# Patient Record
Sex: FEMALE | Race: WHITE | Employment: OTHER | URBAN - METROPOLITAN AREA
[De-identification: names, ages, dates, MRNs, and addresses within clinical notes are randomized per-mention and may not be internally consistent; named-entity substitution may affect disease eponyms.]

---

## 2020-12-14 ENCOUNTER — APPOINTMENT (OUTPATIENT)
Dept: CV DIAGNOSTICS | Facility: HOSPITAL | Age: 70
End: 2020-12-14
Attending: INTERNAL MEDICINE
Payer: MEDICARE

## 2020-12-14 ENCOUNTER — APPOINTMENT (OUTPATIENT)
Dept: CT IMAGING | Facility: HOSPITAL | Age: 70
End: 2020-12-14
Attending: HOSPITALIST
Payer: MEDICARE

## 2020-12-14 ENCOUNTER — HOSPITAL ENCOUNTER (OUTPATIENT)
Facility: HOSPITAL | Age: 70
Setting detail: OBSERVATION
LOS: 1 days | Discharge: HOME | End: 2020-12-15
Attending: EMERGENCY MEDICINE | Admitting: HOSPITALIST
Payer: MEDICARE

## 2020-12-14 DIAGNOSIS — I16.0 HYPERTENSIVE URGENCY: Primary | ICD-10-CM

## 2020-12-14 DIAGNOSIS — H57.04 FIXED DILATED PUPILS OF BOTH EYES: ICD-10-CM

## 2020-12-14 LAB
HCT VFR BLD CALC: 42.1 %
HGB BLD-MCNC: 13.8 G/DL
MCH RBC QN AUTO: 30.7 PG
MCHC RBC AUTO-ENTMCNC: 32.8 G/DL
MCV RBC AUTO: 93.6 FL
PLATELET # BLD: 355 K/MCL
RBC # BLD: 4.5 10*6/UL
TSH SERPL-ACNC: 2.43 MCUNITS/ML
WBC # BLD: 9.9 K/MCL

## 2020-12-14 PROCEDURE — 99220 INITIAL OBSERVATION CARE,LEVL III: CPT | Performed by: HOSPITALIST

## 2020-12-14 PROCEDURE — 93306 TTE W/DOPPLER COMPLETE: CPT | Performed by: INTERNAL MEDICINE

## 2020-12-14 PROCEDURE — 70450 CT HEAD/BRAIN W/O DYE: CPT | Performed by: HOSPITALIST

## 2020-12-14 RX ORDER — METOCLOPRAMIDE HYDROCHLORIDE 5 MG/ML
10 INJECTION INTRAMUSCULAR; INTRAVENOUS EVERY 8 HOURS PRN
Status: DISCONTINUED | OUTPATIENT
Start: 2020-12-14 | End: 2020-12-15

## 2020-12-14 RX ORDER — ONDANSETRON 2 MG/ML
4 INJECTION INTRAMUSCULAR; INTRAVENOUS EVERY 6 HOURS PRN
Status: DISCONTINUED | OUTPATIENT
Start: 2020-12-14 | End: 2020-12-15

## 2020-12-14 RX ORDER — METOPROLOL SUCCINATE 50 MG/1
50 TABLET, EXTENDED RELEASE ORAL
Status: DISCONTINUED | OUTPATIENT
Start: 2020-12-14 | End: 2020-12-15

## 2020-12-14 RX ORDER — METOPROLOL SUCCINATE 25 MG/1
25 TABLET, EXTENDED RELEASE ORAL
Status: DISCONTINUED | OUTPATIENT
Start: 2020-12-14 | End: 2020-12-14

## 2020-12-14 RX ORDER — HYDRALAZINE HYDROCHLORIDE 20 MG/ML
20 INJECTION INTRAMUSCULAR; INTRAVENOUS ONCE
Status: COMPLETED | OUTPATIENT
Start: 2020-12-14 | End: 2020-12-14

## 2020-12-14 RX ORDER — HYDROCODONE BITARTRATE AND ACETAMINOPHEN 5; 325 MG/1; MG/1
1 TABLET ORAL EVERY 4 HOURS PRN
Status: DISCONTINUED | OUTPATIENT
Start: 2020-12-14 | End: 2020-12-15

## 2020-12-14 RX ORDER — HYDROCODONE BITARTRATE AND ACETAMINOPHEN 5; 325 MG/1; MG/1
2 TABLET ORAL EVERY 4 HOURS PRN
Status: DISCONTINUED | OUTPATIENT
Start: 2020-12-14 | End: 2020-12-15

## 2020-12-14 RX ORDER — METOPROLOL TARTRATE 5 MG/5ML
10 INJECTION INTRAVENOUS ONCE
Status: COMPLETED | OUTPATIENT
Start: 2020-12-14 | End: 2020-12-14

## 2020-12-14 RX ORDER — ACETAMINOPHEN 325 MG/1
650 TABLET ORAL EVERY 4 HOURS PRN
Status: DISCONTINUED | OUTPATIENT
Start: 2020-12-14 | End: 2020-12-15

## 2020-12-14 RX ORDER — HYDRALAZINE HYDROCHLORIDE 20 MG/ML
10 INJECTION INTRAMUSCULAR; INTRAVENOUS ONCE
Status: COMPLETED | OUTPATIENT
Start: 2020-12-14 | End: 2020-12-14

## 2020-12-14 NOTE — HOME CARE LIAISON
Received referral from [address] [place] [address]. Spoke with patient, confirmed agreeable to [facility], pending orders. All questions addressed and answered. Patient will need a face to face entered prior to discharge.

## 2020-12-14 NOTE — CM/SW NOTE
Per RN rounds, pt may benefit from [place] services at time of d/c. Referral initiated w/ Irene from [facility]. Received update from Irene w/ [facility] [place] - they are able to accept and pt is agreeable to services at time of d/c. Ireen updated pt's RN and Dr. Gerardo Seip.

## 2020-12-14 NOTE — CONSULTS
[facility]    PATIENT'S NAME: Stephanie Bender   ATTENDING PHYSICIAN: Fidel Rhodes MD   CONSULTING PHYSICIAN: Louis Muller.  Colleen Persaud MD   PATIENT ACCOUNT#:   [id number]    LOCATION:  [address] #:   [id number]       DATE OF BIRTH:  [birth year] 204/100, most recent blood pressure is 143/73; pulse 104, regular rhythm; respirations 18, unlabored; afebrile; saturation 98% on room air. HEENT:  Unremarkable. NECK:  Supple. Jugular venous pressure normal.  Carotids brisk without bruits.   Earnstine Beverage

## 2020-12-14 NOTE — ED INITIAL ASSESSMENT (HPI)
S: HBP  B: Patient states that she felt unwell around late afternoon. Patient states that she checked her bp at home and got systolic over 318. No hx of hbp.  Patient states she feels \"shaky\"

## 2020-12-14 NOTE — CONSULTS
Cardiology (consult dictated). Assessment:  1. Hypertensive urgency. Blood pressure improved after administration of IV metoprolol and hydralazine. 2.  No other major medical history. Plan:  1. Begin metoprolol succinate 50 mg daily.     2.  Mee Martines

## 2020-12-14 NOTE — ED PROVIDER NOTES
Patient Seen in: [facility] AND [facility] Emergency Department      History   Patient presents with:  Hypertension    Stated Complaint: high bp and palpitations     HPI    50-year-old female with no significant past medical history here with complaints of eleva noted in HPI. Constitutional and vital signs reviewed. All other systems reviewed and negative except as noted above.     Physical Exam     ED Triage Vitals   BP 12/14/20 0329 (!) 204/100   Pulse 12/14/20 0327 103   Resp 12/14/20 0327 20   Temp 12/14/ mmol/L    Potassium 3.8 3.5 - 5.1 mmol/L    Chloride 103 98 - 112 mmol/L    CO2 28.0 21.0 - 32.0 mmol/L    Anion Gap 7 0 - 18 mmol/L    BUN 18 7 - 18 mg/dL    Creatinine 0.92 0.55 - 1.02 mg/dL    BUN/CREA Ratio 19.6 10.0 - 20.0    Calcium, Total 9.0 8.5 - COURSE AND TREATMENT:  Patient's condition was fair during Emergency Department evaluation.      73yoF with hypertension  - I personally reviewed and interpreted all the ED vitals  - afebrile, hypertensive  - I ordered and personally reviewed the labs and i recommend that you schedule follow up care with a primary care provider within the next three months to obtain basic health screening including reassessment of your blood pressure.       Medications Prescribed:  There are no discharge medications for this p

## 2020-12-14 NOTE — H&P
Donta Christian 67 Patient Status:  Inpatient    1950 MRN [id number]   Location [facility] 3W/SW Attending Anabella Montana MD   Hosp Day # 0 PCP None Pcp     Date:  2020  Date of Admissio Exam:   Vital Signs:  Blood pressure (!) 161/64, pulse 112, temperature 98.5 °F (36.9 °C), temperature source Oral, resp. rate 18, height 5' 1\" (1.549 m), SpO2 98 %.      GENERAL:  Positive for dilated pupils  SKIN:  Warm and hydrated  PSYCHIATRIC: Calm an 161/64  Patient has dilated pupils on physical exam- will order CT of head to rule out stroke  Tylenol for headache   Consult Cardiology  May need stress test and echo- will await Cards recs  Will also need to start PO BP meds- will await recs   Monitor on

## 2020-12-14 NOTE — ED NOTES
Patient presents to ER after having high blood pressure reading at home patient states that late afternoon on Sunday felt \"nausous and shakey\" took blood pressure at home and noted 200/100 with feeling of shortness of breath patient also report heart rat

## 2020-12-14 NOTE — ED NOTES
Orders for admission, patient is aware of plan and ready to go upstairs. Any questions, please call ED RN Lindsay  at extension [phone].    Patient presents to ER after feeling shaky and nauseous starting yesterday afternoon took blood pressure at home and no

## 2020-12-15 VITALS
BODY MASS INDEX: 34.15 KG/M2 | DIASTOLIC BLOOD PRESSURE: 67 MMHG | TEMPERATURE: 99 F | HEIGHT: 61 IN | HEART RATE: 67 BPM | SYSTOLIC BLOOD PRESSURE: 153 MMHG | OXYGEN SATURATION: 96 % | WEIGHT: 180.88 LBS | RESPIRATION RATE: 16 BRPM

## 2020-12-15 LAB
ANION GAP SERPL CALC-SCNC: 9 MMOL/L
BUN SERPL-MCNC: 13 MG/DL
BUN/CREAT SERPL: 12.5
CALCIUM SERPL-MCNC: 9.5 MG/DL
CHLORIDE SERPL-SCNC: 102 MMOL/L
CO2 SERPL-SCNC: 28 MMOL/L
CREAT SERPL-MCNC: 1.04 MG/DL
GLUCOSE SERPL-MCNC: 124 MG/DL
POTASSIUM SERPL-SCNC: 4 MMOL/L
SODIUM SERPL-SCNC: 139 MMOL/L

## 2020-12-15 PROCEDURE — 99217 OBSERVATION CARE DISCHARGE: CPT | Performed by: HOSPITALIST

## 2020-12-15 RX ORDER — METOPROLOL SUCCINATE 50 MG/1
50 TABLET, EXTENDED RELEASE ORAL
Qty: 30 TABLET | Refills: 2 | Status: SHIPPED | OUTPATIENT
Start: 2020-12-16 | End: 2021-03-18 | Stop reason: DRUGHIGH

## 2020-12-15 NOTE — PLAN OF CARE
Problem: Patient Centered Care  Goal: Patient preferences are identified and integrated in the patient's plan of care  Description: Interventions:  - What would you like us to know as we care for you?  I live at home alone  - Provide timely, complete, and Patient ambulating. Call light and personal belongings within reach. Non-skid socks on. Frequent rounding continued.

## 2020-12-15 NOTE — CM/SW NOTE
Patient failed inpatient criteria. Second level of review completed and supports observation. UR committee in agreement. Discussed with Dr. Kirt Lombardi  who approves observation status. MOON given to the patient and order written.

## 2020-12-15 NOTE — DISCHARGE SUMMARY
[facility]    Discharge Summary    [place] Patient Status:  Inpatient    1950 MRN [id number]   Location [facility] 3W/SW Attending Herminia Galdamez MD   [facility] Day # 1 PCP None Pcp     Date of Admission: 2020   Date see an integrative medicine doctor- Recommended Dr. Ramírez Beltran  - will need stress test to rule out CAD- order placed  - will also need MRI of the brain to rule out cause of dilated pupils (tumor?  Herniation?)- patient asymptomatic     DVT proph- heparin    Fu reached by phone at [phone]. The fax number for your reference is [phone]. A representative from the home health agency will contact you or your family to schedule your first visit.             Time spent:  > 30 minutes    [address]  12/

## 2020-12-15 NOTE — PROGRESS NOTES
[facility]IN[facility]  Progress Note    Louie Velasquez Patient Status:  Inpatient    1950 MRN [id number]   Location [facility] 3W/SW Attending Aby Garcias MD   Hosp Day # 1 PCP None Pcp     Assessment:    1. Hypertension.   Better control th PT, INR        Lab Results   Component Value Date    TROP <0.045 12/14/2020    TROP <0.045 12/14/2020        Medications:    • Metoprolol Succinate ER  50 mg Oral Daily Beta Blocker         Chey Doss MD  12/15/2020  8:38 AM

## 2020-12-16 ENCOUNTER — TELEPHONE (OUTPATIENT)
Dept: CARDIOLOGY | Age: 70
End: 2020-12-16

## 2020-12-16 ENCOUNTER — TELEPHONE (OUTPATIENT)
Dept: INTERNAL MEDICINE UNIT | Facility: HOSPITAL | Age: 70
End: 2020-12-16

## 2020-12-16 ENCOUNTER — TELEPHONE (OUTPATIENT)
Dept: CARDIOLOGY CLINIC | Facility: CLINIC | Age: 70
End: 2020-12-16

## 2020-12-16 NOTE — TELEPHONE ENCOUNTER
Need new order for Cardiac Stress test, with Qualifying Medicare [employer] - Pt. has sched appt. for 12/21/2020. RN states that Dr Nasreen Shearer saw this pt. At [facility] for Consult on 12/14/2020 and again on 12/15/2020. Please enter order in Epic.

## 2020-12-16 NOTE — TELEPHONE ENCOUNTER
Call received from Jenny Stevens [phone]) stating Hypertensive urgency is not a qualifying diagnosis for Outpatient Stress test and a new order was needed, pt is scheduled on 12/21/2020.  Call made to Dr Sherlyn Christianson, cardiology office for further

## 2020-12-16 NOTE — TELEPHONE ENCOUNTER
Call received by Hospitalist DEMI Ring from Cardiology triage stating Dr Naz Barrientos would like to see pt in the office prior to having pt do a stress test. RN to cancel stress test ordered and cardiology will assess pt in the office then reenter order as ne

## 2020-12-16 NOTE — TELEPHONE ENCOUNTER
Spoke with Peter Patient, Dr. Kaylen Hernandes RN at [address] [place] [address]. Discharge summary instructs patient to follow up at [facility] with Dr. Jama Woodruff in 3 weeks. She will have their office contact patient for appointment with [facility] at [facility].   Per Dr. Jama Woodruff consult note 12/14,   \"Begin metopr

## 2020-12-17 ENCOUNTER — TELEPHONE (OUTPATIENT)
Dept: INTEGRATIVE MEDICINE | Facility: CLINIC | Age: 70
End: 2020-12-17

## 2020-12-17 ENCOUNTER — OFFICE VISIT (OUTPATIENT)
Dept: INTEGRATIVE MEDICINE | Facility: CLINIC | Age: 70
End: 2020-12-17
Payer: MEDICARE

## 2020-12-17 VITALS
OXYGEN SATURATION: 97 % | HEART RATE: 75 BPM | DIASTOLIC BLOOD PRESSURE: 88 MMHG | HEIGHT: 60.5 IN | WEIGHT: 181.63 LBS | SYSTOLIC BLOOD PRESSURE: 152 MMHG | BODY MASS INDEX: 34.74 KG/M2

## 2020-12-17 DIAGNOSIS — M25.552 LEFT HIP PAIN: ICD-10-CM

## 2020-12-17 DIAGNOSIS — S46.011A STRAIN OF RIGHT ROTATOR CUFF CAPSULE, INITIAL ENCOUNTER: ICD-10-CM

## 2020-12-17 DIAGNOSIS — I10 HYPERTENSION, ESSENTIAL: Primary | ICD-10-CM

## 2020-12-17 PROCEDURE — 99496 TRANSJ CARE MGMT HIGH F2F 7D: CPT | Performed by: FAMILY MEDICINE

## 2020-12-17 PROCEDURE — 1111F DSCHRG MED/CURRENT MED MERGE: CPT | Performed by: FAMILY MEDICINE

## 2020-12-17 NOTE — TELEPHONE ENCOUNTER
Vero Ocasio [facility]  QB#[phone] requesting if provider will sign off home health RN orders - Patient is to be seen this evening.  She is requesting a call back, does not need to speak directly with provider may speak with medical staff

## 2020-12-17 NOTE — TELEPHONE ENCOUNTER
Routed to Dr. Stephen Pulido for review and recommendation. Will await response after patient's initial visit tonight at 6:30 pm. Unless MD is agreeable to sign off on orders prior to appt.

## 2020-12-18 ENCOUNTER — TELEPHONE (OUTPATIENT)
Dept: CARDIOLOGY | Age: 70
End: 2020-12-18

## 2020-12-18 ENCOUNTER — TELEPHONE (OUTPATIENT)
Dept: INTEGRATIVE MEDICINE | Facility: CLINIC | Age: 70
End: 2020-12-18

## 2020-12-18 NOTE — TELEPHONE ENCOUNTER
Spoke with Lucinda from [facility] VV#[phone]. I asked for a faxed order regarding refusal of care. Lucinda said home health was not initiated yet - that is what pt is refusing. No orders to sign off on. Will notify MD so she is aware.

## 2020-12-18 NOTE — PATIENT INSTRUCTIONS
I have complete vero in the body's ability to heal and transform. The products and items listed below (the “Products”)  and their claims have not been evaluated by the Food and Drug Administration.  Dietary products are not intended to treat, prevent, m before taking a measurement. Also, go to the toilet first. A full bladder can increase blood pressure slightly. - Sit quietly before and during monitoring.  When you're ready to take your blood pressure, sit for five minutes in a comfortable position with

## 2020-12-18 NOTE — TELEPHONE ENCOUNTER
Left message notifying Ana Berry that Dr. Andrew Blanchard is agreeable to sign off on home health orders. Provided office phone and fax numbers.

## 2020-12-18 NOTE — PROGRESS NOTES
Jamie France is a 72year old female.   Patient presents with:  [facility] F/U: hypertensive urgency   Back Pain: stiff lower back   Hip Pain: L hip   Shoulder Pain: R shoulder pain       HPI:     3 days ago she Started feeling nauseous, HA, Food insecurity        Worry: Not on file        Inability: Not on file      Transportation needs        Medical: Not on file        Non-medical: Not on file    Tobacco Use      Smoking status: Never Smoker      Smokeless tobacco: Never Used    Substan Pulmonary/Chest: Effort normal and breath sounds normal.   Musculoskeletal:         General: No edema. Lymphadenopathy:     She has no cervical adenopathy. Neurological: She is alert and oriented to person, place, and time. No cranial nerve deficit.  Hany Mendez The products and items listed below (the “Products”)  and their claims have not been evaluated by the Food and Drug Administration. Dietary products are not intended to treat, prevent, mitigate or cure disease.  Ultimately, you must draw your own conclusion - Avoid food, caffeine, tobacco and alcohol for 30 minutes before taking a measurement. Also, go to the toilet first. A full bladder can increase blood pressure slightly. - Sit quietly before and during monitoring.  When you're ready to take your blood pre

## 2020-12-24 ENCOUNTER — HOSPITAL ENCOUNTER (OUTPATIENT)
Dept: MRI IMAGING | Facility: HOSPITAL | Age: 70
Discharge: HOME | End: 2020-12-24
Attending: HOSPITALIST
Payer: MEDICARE

## 2020-12-24 DIAGNOSIS — H57.04 FIXED DILATED PUPILS OF BOTH EYES: ICD-10-CM

## 2020-12-24 DIAGNOSIS — I16.0 HYPERTENSIVE URGENCY: ICD-10-CM

## 2020-12-24 PROCEDURE — A9575 INJ GADOTERATE MEGLUMI 0.1ML: HCPCS | Performed by: HOSPITALIST

## 2020-12-24 PROCEDURE — 70553 MRI BRAIN STEM W/O & W/DYE: CPT | Performed by: HOSPITALIST

## 2021-01-05 RX ORDER — METOPROLOL SUCCINATE 50 MG/1
0.5 TABLET, EXTENDED RELEASE ORAL DAILY
COMMUNITY
Start: 2020-12-15 | End: 2021-01-08 | Stop reason: DRUGHIGH

## 2021-01-06 ENCOUNTER — TELEPHONE (OUTPATIENT)
Dept: CARDIOLOGY | Age: 71
End: 2021-01-06

## 2021-01-08 ENCOUNTER — OFFICE VISIT (OUTPATIENT)
Dept: CARDIOLOGY | Age: 71
End: 2021-01-08

## 2021-01-08 ENCOUNTER — ANCILLARY PROCEDURE (OUTPATIENT)
Dept: CARDIOLOGY | Age: 71
End: 2021-01-08
Attending: INTERNAL MEDICINE

## 2021-01-08 VITALS
DIASTOLIC BLOOD PRESSURE: 80 MMHG | SYSTOLIC BLOOD PRESSURE: 146 MMHG | OXYGEN SATURATION: 98 % | WEIGHT: 179 LBS | HEART RATE: 74 BPM | BODY MASS INDEX: 33.79 KG/M2 | HEIGHT: 61 IN | RESPIRATION RATE: 18 BRPM

## 2021-01-08 DIAGNOSIS — I10 ESSENTIAL HYPERTENSION: Primary | ICD-10-CM

## 2021-01-08 DIAGNOSIS — R00.2 PALPITATIONS: ICD-10-CM

## 2021-01-08 DIAGNOSIS — R06.83 SNORES: ICD-10-CM

## 2021-01-08 PROCEDURE — 99205 OFFICE O/P NEW HI 60 MIN: CPT | Performed by: INTERNAL MEDICINE

## 2021-01-08 RX ORDER — METOPROLOL SUCCINATE 25 MG/1
25 TABLET, EXTENDED RELEASE ORAL 2 TIMES DAILY
Qty: 180 TABLET | Refills: 3 | Status: SHIPPED | OUTPATIENT
Start: 2021-01-08

## 2021-01-08 SDOH — HEALTH STABILITY: MENTAL HEALTH: HOW OFTEN DO YOU HAVE A DRINK CONTAINING ALCOHOL?: MONTHLY OR LESS

## 2021-01-08 ASSESSMENT — PATIENT HEALTH QUESTIONNAIRE - PHQ9
SUM OF ALL RESPONSES TO PHQ9 QUESTIONS 1 AND 2: 0
2. FEELING DOWN, DEPRESSED OR HOPELESS: NOT AT ALL
1. LITTLE INTEREST OR PLEASURE IN DOING THINGS: NOT AT ALL
2. FEELING DOWN, DEPRESSED OR HOPELESS: NOT AT ALL
SUM OF ALL RESPONSES TO PHQ9 QUESTIONS 1 AND 2: 0
CLINICAL INTERPRETATION OF PHQ2 SCORE: NO FURTHER SCREENING NEEDED
1. LITTLE INTEREST OR PLEASURE IN DOING THINGS: NOT AT ALL
CLINICAL INTERPRETATION OF PHQ2 SCORE: NO FURTHER SCREENING NEEDED
CLINICAL INTERPRETATION OF PHQ9 SCORE: NO FURTHER SCREENING NEEDED
SUM OF ALL RESPONSES TO PHQ9 QUESTIONS 1 AND 2: 0

## 2021-01-11 ENCOUNTER — MED REC SCAN ONLY (OUTPATIENT)
Dept: INTEGRATIVE MEDICINE | Facility: CLINIC | Age: 71
End: 2021-01-11

## 2021-01-12 ENCOUNTER — ANCILLARY PROCEDURE (OUTPATIENT)
Dept: CARDIOLOGY | Age: 71
End: 2021-01-12
Attending: INTERNAL MEDICINE

## 2021-01-12 DIAGNOSIS — R06.83 SNORES: ICD-10-CM

## 2021-01-12 DIAGNOSIS — I10 ESSENTIAL HYPERTENSION: ICD-10-CM

## 2021-01-13 PROCEDURE — 95800 SLP STDY UNATTENDED: CPT | Performed by: INTERNAL MEDICINE

## 2021-01-13 PROCEDURE — 93227 XTRNL ECG REC<48 HR R&I: CPT | Performed by: INTERNAL MEDICINE

## 2021-01-25 ENCOUNTER — TELEPHONE (OUTPATIENT)
Dept: CARDIOLOGY | Age: 71
End: 2021-01-25

## 2021-01-25 ENCOUNTER — OFFICE VISIT (OUTPATIENT)
Dept: INTEGRATIVE MEDICINE | Facility: CLINIC | Age: 71
End: 2021-01-25
Payer: MEDICARE

## 2021-01-25 VITALS
OXYGEN SATURATION: 99 % | DIASTOLIC BLOOD PRESSURE: 90 MMHG | BODY MASS INDEX: 34.88 KG/M2 | HEIGHT: 60.5 IN | HEART RATE: 72 BPM | SYSTOLIC BLOOD PRESSURE: 220 MMHG | WEIGHT: 182.38 LBS

## 2021-01-25 DIAGNOSIS — I16.0 HYPERTENSIVE URGENCY: Primary | ICD-10-CM

## 2021-01-25 DIAGNOSIS — G47.33 OBSTRUCTIVE SLEEP APNEA SYNDROME: ICD-10-CM

## 2021-01-25 DIAGNOSIS — R00.2 PALPITATIONS: ICD-10-CM

## 2021-01-25 DIAGNOSIS — R11.0 NAUSEA: ICD-10-CM

## 2021-01-25 DIAGNOSIS — I10 HYPERTENSION, ESSENTIAL: ICD-10-CM

## 2021-01-25 PROBLEM — R06.83 SNORES: Status: RESOLVED | Noted: 2021-01-08 | Resolved: 2021-01-25

## 2021-01-25 PROBLEM — R06.83 SNORES: Status: ACTIVE | Noted: 2021-01-08

## 2021-01-25 PROCEDURE — 99214 OFFICE O/P EST MOD 30 MIN: CPT | Performed by: FAMILY MEDICINE

## 2021-01-25 RX ORDER — LOSARTAN POTASSIUM 100 MG/1
TABLET ORAL
Qty: 90 TABLET | Refills: 0 | OUTPATIENT
Start: 2021-01-25

## 2021-01-25 RX ORDER — LOSARTAN POTASSIUM 100 MG/1
100 TABLET ORAL DAILY
COMMUNITY
End: 2021-01-29 | Stop reason: DRUGHIGH

## 2021-01-25 RX ORDER — LOSARTAN POTASSIUM 100 MG/1
100 TABLET ORAL DAILY
Qty: 30 TABLET | Refills: 1 | Status: SHIPPED | OUTPATIENT
Start: 2021-01-25 | End: 2021-03-18

## 2021-01-25 NOTE — PROGRESS NOTES
Daily Note     Today's date: 2018  Patient name: Michelle Prakash  : 1949  MRN: [id number]  Referring provider: Ruiz Restrepo MD  Dx:   Encounter Diagnosis     ICD-10-CM    1  Primary osteoarthritis of right knee M17 11                   Subjective: Pt states he is feeling better since his last visit  "It's not so much as pain, but more stiffness "  Pt reported that any kind of twisting drastically increases levels of pain  Received ambulating into gym using SPC  Pt reported feeling more loose at end of session  Objective: See treatment diary below  Assessment: Tolerated treatment well  Patient would benefit from continued PT  Pt required little to no verbal cueing for proper form and technique while performing exercises  Minimal verbal cues needed for posture during FSU  Pt exhibit pain at end range during PROM of knee in both flex/ext  Pt ambulated with antalgic gait of R LE which improved throughout session  Plan: Continue per plan of care  Progress treatment as tolerated  Precautions: right TKA (DOS: 10/22/18), trigeminal neuralgia    Daily Treatment Diary     Manual            Right knee PROM GR JM AB          Right gastroc, hamstring, hip flexor str  Gr  II-IV pat  mobs GR            Reevaluation GR                             Exercise Diary            Recumbent bike 10' 10' 10'          VG 7' L7 5' L7 5' L7          Long-sitting gastroc str  TKE             Heel slides             Supine knee ext  LLPS             Supine SLR  2#  3x10 2#  3x10          Standing hip abd, ext with TB             SAQ  2# 5"  3x10 2# 5"  3x10          Seated knee extension, flexion AAROM             Slantboard gastroc str    30"x5 30"x5          Mini squats             LAQ  2#  3x10 2#  3x10          FSU  6"  2x10 6"  2x10          FSD  6"  2x10 6"  2x10          Supine wall slides Lachelle Sadia is a 72year old female. Patient presents with: Follow - Up      HPI:     Seeing cardiologist.   Recently diagnosed with SYLVIA. Will need to do further testing to evaluate this and get set up with CPAP.      BP at home today was 200/100, Modalities              CP knee ext Smoking status: Never Smoker      Smokeless tobacco: Never Used    Substance and Sexual Activity      Alcohol use: Yes        Frequency: Monthly or less        Drinks per session: 1 or 2        Binge frequency: Never      Drug use: Never      Sexual Faheem Guerrero Lymphadenopathy:     She has no cervical adenopathy. Neurological: She is alert and oriented to person, place, and time. No cranial nerve deficit. Gait normal.   Skin: Skin is warm and dry.    Psychiatric: Affect normal.       ASSESSMENT AND PLAN:

## 2021-01-26 ENCOUNTER — MED REC SCAN ONLY (OUTPATIENT)
Dept: INTEGRATIVE MEDICINE | Facility: CLINIC | Age: 71
End: 2021-01-26

## 2021-01-28 ENCOUNTER — TELEPHONE (OUTPATIENT)
Dept: CARDIOLOGY | Age: 71
End: 2021-01-28

## 2021-01-28 ENCOUNTER — TELEPHONE (OUTPATIENT)
Dept: INTEGRATIVE MEDICINE | Facility: CLINIC | Age: 71
End: 2021-01-28

## 2021-01-28 NOTE — TELEPHONE ENCOUNTER
She would only hold the blood pressure for a reading of less than 100/60. Otherwise she should take both medications daily. What was her reading that she considered \"too low\"? I need to know this before deciding if she can cut the dose back.

## 2021-01-28 NOTE — TELEPHONE ENCOUNTER
Pt states she never went under 110's/70-80's. She is stating she \"cannot\" take the 100 mg tab and would like to know if she can take 1/2 tab (50mg total) daily.

## 2021-01-28 NOTE — TELEPHONE ENCOUNTER
Pt evaluated on Monday 1/25; BP elevated and started on Losartan 100mg tabs. Pt states she took first dose on evening of 1/25; skipped 1/26 dose due to her BP \"was low\".  On 1/27 at 11pm, BP increased to 186/87 and she took 1 tab; 30 minutes later felt di

## 2021-01-29 ENCOUNTER — OFFICE VISIT (OUTPATIENT)
Dept: CARDIOLOGY | Age: 71
End: 2021-01-29

## 2021-01-29 ENCOUNTER — DOCUMENTATION (OUTPATIENT)
Dept: CARDIOLOGY | Age: 71
End: 2021-01-29

## 2021-01-29 VITALS
BODY MASS INDEX: 33.79 KG/M2 | WEIGHT: 179 LBS | OXYGEN SATURATION: 98 % | DIASTOLIC BLOOD PRESSURE: 72 MMHG | HEART RATE: 72 BPM | HEIGHT: 61 IN | SYSTOLIC BLOOD PRESSURE: 140 MMHG

## 2021-01-29 DIAGNOSIS — I10 ESSENTIAL HYPERTENSION: Primary | ICD-10-CM

## 2021-01-29 PROCEDURE — 99214 OFFICE O/P EST MOD 30 MIN: CPT | Performed by: INTERNAL MEDICINE

## 2021-01-29 RX ORDER — LOSARTAN POTASSIUM 25 MG/1
25 TABLET ORAL 2 TIMES DAILY
Qty: 60 TABLET | Refills: 11 | Status: SHIPPED | OUTPATIENT
Start: 2021-01-29 | End: 2021-02-02 | Stop reason: ALTCHOICE

## 2021-01-29 ASSESSMENT — PATIENT HEALTH QUESTIONNAIRE - PHQ9
SUM OF ALL RESPONSES TO PHQ9 QUESTIONS 1 AND 2: 0
SUM OF ALL RESPONSES TO PHQ9 QUESTIONS 1 AND 2: 0
CLINICAL INTERPRETATION OF PHQ2 SCORE: NO FURTHER SCREENING NEEDED
2. FEELING DOWN, DEPRESSED OR HOPELESS: NOT AT ALL
1. LITTLE INTEREST OR PLEASURE IN DOING THINGS: NOT AT ALL
CLINICAL INTERPRETATION OF PHQ9 SCORE: NO FURTHER SCREENING NEEDED

## 2021-01-30 ENCOUNTER — TELEPHONE (OUTPATIENT)
Dept: CARDIOLOGY | Age: 71
End: 2021-01-30

## 2021-01-31 ENCOUNTER — TELEPHONE (OUTPATIENT)
Dept: CARDIOLOGY | Age: 71
End: 2021-01-31

## 2021-02-01 ENCOUNTER — TELEPHONE (OUTPATIENT)
Dept: CARDIOLOGY | Age: 71
End: 2021-02-01

## 2021-02-12 ENCOUNTER — ANCILLARY PROCEDURE (OUTPATIENT)
Dept: CARDIOLOGY | Age: 71
End: 2021-02-12
Attending: INTERNAL MEDICINE

## 2021-02-12 ENCOUNTER — TELEPHONE (OUTPATIENT)
Dept: CARDIOLOGY | Age: 71
End: 2021-02-12

## 2021-02-12 DIAGNOSIS — I10 ESSENTIAL HYPERTENSION: ICD-10-CM

## 2021-02-12 PROCEDURE — 93975 VASCULAR STUDY: CPT | Performed by: INTERNAL MEDICINE

## 2021-02-17 ENCOUNTER — TELEPHONE (OUTPATIENT)
Dept: INTEGRATIVE MEDICINE | Facility: CLINIC | Age: 71
End: 2021-02-17

## 2021-02-17 ENCOUNTER — OFFICE VISIT (OUTPATIENT)
Dept: CARDIOLOGY | Age: 71
End: 2021-02-17

## 2021-02-17 VITALS
OXYGEN SATURATION: 98 % | BODY MASS INDEX: 34.75 KG/M2 | SYSTOLIC BLOOD PRESSURE: 138 MMHG | DIASTOLIC BLOOD PRESSURE: 78 MMHG | HEART RATE: 78 BPM | WEIGHT: 177 LBS | HEIGHT: 60 IN

## 2021-02-17 DIAGNOSIS — I10 ESSENTIAL HYPERTENSION: ICD-10-CM

## 2021-02-17 DIAGNOSIS — I70.1 RENAL ARTERY STENOSIS IN 1 OF 2 VESSELS (CMD): Primary | ICD-10-CM

## 2021-02-17 PROCEDURE — 99214 OFFICE O/P EST MOD 30 MIN: CPT | Performed by: INTERNAL MEDICINE

## 2021-02-17 RX ORDER — HYDROCHLOROTHIAZIDE 12.5 MG/1
12.5 TABLET ORAL DAILY
Qty: 30 TABLET | Refills: 3 | Status: SHIPPED | OUTPATIENT
Start: 2021-02-17 | End: 2021-03-16

## 2021-02-17 SDOH — HEALTH STABILITY: MENTAL HEALTH: HOW OFTEN DO YOU HAVE A DRINK CONTAINING ALCOHOL?: NOT ASKED

## 2021-02-17 ASSESSMENT — PATIENT HEALTH QUESTIONNAIRE - PHQ9
SUM OF ALL RESPONSES TO PHQ9 QUESTIONS 1 AND 2: 0
CLINICAL INTERPRETATION OF PHQ9 SCORE: NO FURTHER SCREENING NEEDED
SUM OF ALL RESPONSES TO PHQ9 QUESTIONS 1 AND 2: 0
1. LITTLE INTEREST OR PLEASURE IN DOING THINGS: NOT AT ALL
CLINICAL INTERPRETATION OF PHQ2 SCORE: NO FURTHER SCREENING NEEDED
2. FEELING DOWN, DEPRESSED OR HOPELESS: NOT AT ALL

## 2021-02-23 ENCOUNTER — LAB ENCOUNTER (OUTPATIENT)
Dept: LAB | Facility: HOSPITAL | Age: 71
End: 2021-02-23
Attending: FAMILY MEDICINE
Payer: MEDICARE

## 2021-02-23 DIAGNOSIS — I10 ESSENTIAL HYPERTENSION, MALIGNANT: Primary | ICD-10-CM

## 2021-02-23 LAB
ALDOST SERPL-MCNC: 7.9 NG/DL
ANION GAP SERPL CALC-SCNC: 3 MMOL/L
ANION GAP SERPL CALC-SCNC: 3 MMOL/L (ref 0–18)
BUN BLD-MCNC: 15 MG/DL (ref 7–18)
BUN SERPL-MCNC: 15 MG/DL
BUN/CREAT SERPL: 16
BUN/CREAT SERPL: 16 (ref 10–20)
CALCIUM BLD-MCNC: 9.4 MG/DL (ref 8.5–10.1)
CALCIUM SERPL-MCNC: 9.4 MG/DL
CHLORIDE SERPL-SCNC: 106 MMOL/L
CHLORIDE SERPL-SCNC: 106 MMOL/L (ref 98–112)
CO2 SERPL-SCNC: 31 MMOL/L
CO2 SERPL-SCNC: 31 MMOL/L (ref 21–32)
CREAT BLD-MCNC: 0.94 MG/DL
CREAT SERPL-MCNC: 0.94 MG/DL
GLUCOSE BLD-MCNC: 114 MG/DL (ref 70–99)
GLUCOSE SERPL-MCNC: 114 MG/DL
OSMOLALITY SERPL CALC.SUM OF ELEC: 292 MOSM/KG (ref 275–295)
PATIENT FASTING Y/N/NP: YES
POTASSIUM SERPL-SCNC: 4.3 MMOL/L
POTASSIUM SERPL-SCNC: 4.3 MMOL/L (ref 3.5–5.1)
SODIUM SERPL-SCNC: 14 MMOL/L
SODIUM SERPL-SCNC: 140 MMOL/L (ref 136–145)

## 2021-02-23 PROCEDURE — 80048 BASIC METABOLIC PNL TOTAL CA: CPT

## 2021-02-23 PROCEDURE — 82088 ASSAY OF ALDOSTERONE: CPT

## 2021-02-23 PROCEDURE — 36415 COLL VENOUS BLD VENIPUNCTURE: CPT

## 2021-02-25 LAB — ALDOSTERONE: 7.9 NG/DL

## 2021-03-09 DIAGNOSIS — Z23 NEED FOR VACCINATION: ICD-10-CM

## 2021-03-15 DIAGNOSIS — Z23 NEED FOR VACCINATION: ICD-10-CM

## 2021-03-16 ENCOUNTER — MED REC SCAN ONLY (OUTPATIENT)
Dept: INTEGRATIVE MEDICINE | Facility: CLINIC | Age: 71
End: 2021-03-16

## 2021-03-16 ENCOUNTER — OFFICE VISIT (OUTPATIENT)
Dept: CARDIOLOGY | Age: 71
End: 2021-03-16

## 2021-03-16 VITALS
DIASTOLIC BLOOD PRESSURE: 72 MMHG | HEART RATE: 72 BPM | OXYGEN SATURATION: 97 % | HEIGHT: 60 IN | WEIGHT: 178 LBS | SYSTOLIC BLOOD PRESSURE: 128 MMHG | BODY MASS INDEX: 34.95 KG/M2

## 2021-03-16 DIAGNOSIS — R06.09 DOE (DYSPNEA ON EXERTION): ICD-10-CM

## 2021-03-16 DIAGNOSIS — I70.1 RENAL ARTERY STENOSIS IN 1 OF 2 VESSELS (CMD): ICD-10-CM

## 2021-03-16 DIAGNOSIS — I10 ESSENTIAL HYPERTENSION: Primary | ICD-10-CM

## 2021-03-16 PROCEDURE — 99214 OFFICE O/P EST MOD 30 MIN: CPT | Performed by: INTERNAL MEDICINE

## 2021-03-16 ASSESSMENT — PATIENT HEALTH QUESTIONNAIRE - PHQ9
CLINICAL INTERPRETATION OF PHQ9 SCORE: NO FURTHER SCREENING NEEDED
1. LITTLE INTEREST OR PLEASURE IN DOING THINGS: NOT AT ALL
2. FEELING DOWN, DEPRESSED OR HOPELESS: NOT AT ALL
CLINICAL INTERPRETATION OF PHQ2 SCORE: NO FURTHER SCREENING NEEDED
SUM OF ALL RESPONSES TO PHQ9 QUESTIONS 1 AND 2: 0
SUM OF ALL RESPONSES TO PHQ9 QUESTIONS 1 AND 2: 0

## 2021-03-18 ENCOUNTER — ORDER TRANSCRIPTION (OUTPATIENT)
Dept: SLEEP CENTER | Age: 71
End: 2021-03-18

## 2021-03-18 DIAGNOSIS — G47.33 OBSTRUCTIVE SLEEP APNEA (ADULT) (PEDIATRIC): Primary | ICD-10-CM

## 2021-03-20 ENCOUNTER — LAB ENCOUNTER (OUTPATIENT)
Dept: LAB | Age: 71
End: 2021-03-20
Attending: INTERNAL MEDICINE
Payer: MEDICARE

## 2021-03-20 DIAGNOSIS — G47.33 OBSTRUCTIVE SLEEP APNEA (ADULT) (PEDIATRIC): ICD-10-CM

## 2021-03-21 LAB — SARS-COV-2 RNA RESP QL NAA+PROBE: NOT DETECTED

## 2021-03-23 ENCOUNTER — OFFICE VISIT (OUTPATIENT)
Dept: SLEEP CENTER | Age: 71
End: 2021-03-23
Attending: INTERNAL MEDICINE
Payer: MEDICARE

## 2021-03-23 DIAGNOSIS — G47.33 OSA (OBSTRUCTIVE SLEEP APNEA): Primary | ICD-10-CM

## 2021-03-23 PROCEDURE — 95811 POLYSOM 6/>YRS CPAP 4/> PARM: CPT

## 2021-03-29 ENCOUNTER — TELEPHONE (OUTPATIENT)
Dept: CARDIOLOGY | Age: 71
End: 2021-03-29

## 2021-03-29 ENCOUNTER — APPOINTMENT (OUTPATIENT)
Dept: LAB | Age: 71
End: 2021-03-29

## 2021-03-29 DIAGNOSIS — I10 ESSENTIAL HYPERTENSION: Primary | ICD-10-CM

## 2021-06-04 ENCOUNTER — OFFICE VISIT (OUTPATIENT)
Dept: INTEGRATIVE MEDICINE | Facility: CLINIC | Age: 71
End: 2021-06-04
Payer: MEDICARE

## 2021-06-04 VITALS
HEART RATE: 72 BPM | OXYGEN SATURATION: 98 % | HEIGHT: 60 IN | DIASTOLIC BLOOD PRESSURE: 102 MMHG | BODY MASS INDEX: 35.81 KG/M2 | SYSTOLIC BLOOD PRESSURE: 164 MMHG | WEIGHT: 182.38 LBS

## 2021-06-04 DIAGNOSIS — G47.33 OBSTRUCTIVE SLEEP APNEA SYNDROME: ICD-10-CM

## 2021-06-04 DIAGNOSIS — Z12.31 BREAST CANCER SCREENING BY MAMMOGRAM: ICD-10-CM

## 2021-06-04 DIAGNOSIS — Z78.0 POST-MENOPAUSAL: ICD-10-CM

## 2021-06-04 DIAGNOSIS — E66.9 OBESITY (BMI 30-39.9): ICD-10-CM

## 2021-06-04 DIAGNOSIS — I10 ESSENTIAL HYPERTENSION: ICD-10-CM

## 2021-06-04 DIAGNOSIS — R00.2 PALPITATIONS: ICD-10-CM

## 2021-06-04 DIAGNOSIS — Z00.00 ROUTINE MEDICAL EXAM: Primary | ICD-10-CM

## 2021-06-04 PROBLEM — R06.09 DOE (DYSPNEA ON EXERTION): Status: ACTIVE | Noted: 2021-03-16

## 2021-06-04 PROBLEM — I70.1: Status: ACTIVE | Noted: 2021-02-17

## 2021-06-04 PROBLEM — I16.0 HYPERTENSIVE URGENCY: Status: RESOLVED | Noted: 2020-12-14 | Resolved: 2021-06-04

## 2021-06-04 PROBLEM — R06.00 DOE (DYSPNEA ON EXERTION): Status: ACTIVE | Noted: 2021-03-16

## 2021-06-04 PROCEDURE — G0439 PPPS, SUBSEQ VISIT: HCPCS | Performed by: FAMILY MEDICINE

## 2021-06-04 NOTE — PROGRESS NOTES
HPI:   Svitlana Neumann is a 72year old female who presents for a Medicare Subsequent Annual Wellness visit (Pt already had Initial Annual Wellness). 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 Also found to have SYLVIA. Started CPAP two nights ago.   E Palpitations     Obstructive sleep apnea syndrome    Wt Readings from Last 3 Encounters:  06/04/21 : 182 lb 6.4 oz (82.7 kg)  03/18/21 : 177 lb (80.3 kg)  01/25/21 : 182 lb 6.4 oz (82.7 kg)     Last Cholesterol Labs:   No results found for: CHOLEST, HDL, L abdominal distention, abdominal pain, constipation, diarrhea and vomiting. Endocrine: Negative for cold intolerance and heat intolerance.    Genitourinary: Negative for decreased urine volume, difficulty urinating, dyspareunia, dysuria, flank pain, freque clearly): No People get annoyed because I misunderstand what they say: No   I misunderstand what others are saying and make inappropriate responses: No I avoid social activities because I cannot hear well and fear I will reply improperly: No   Family membe Future    Obstructive sleep apnea syndrome  -     MAGNESIUM; Future    Essential hypertension  -     MAGNESIUM; Future    Palpitations  -     MAGNESIUM; Future    Obesity (BMI 30-39.9)  -     MAGNESIUM;  Future    Breast cancer screening by mammogram  - pre-diabetes   One screening every 6 months if diagnosed with pre-diabetes Lab Results   Component Value Date     (H) 02/23/2021        Cardiovascular Disease Screening    Lipid Panel  Cholesterol  Lipoprotein (HDL)  Triglycerides Covered every 5 ye [id number]) covered once after 65 Prevnar 13: -    Niazebevl66: -     Pneumococcal Vaccination(1 of 1 - PPSV23) Never done    Hepatitis B One screening covered for patients with certain risk factors   -  No recommendations at this time    Tetanus Toxoid N

## 2021-06-04 NOTE — PATIENT INSTRUCTIONS
I have complete vero in the body's ability to heal and transform. The products and items listed below (the “Products”)  and their claims have not been evaluated by the Food and Drug Administration.  Dietary products are not intended to treat, prevent, m

## 2021-06-08 ENCOUNTER — TELEPHONE (OUTPATIENT)
Dept: INTEGRATIVE MEDICINE | Facility: CLINIC | Age: 71
End: 2021-06-08

## 2021-06-08 NOTE — TELEPHONE ENCOUNTER
----- Message from Suleman Wayne DO sent at 6/4/2021 11:50 AM CDT -----  Regarding: Cologuachris  Please order Cologuard for patient.

## 2021-06-18 ENCOUNTER — LAB ENCOUNTER (OUTPATIENT)
Dept: LAB | Age: 71
End: 2021-06-18
Attending: FAMILY MEDICINE
Payer: MEDICARE

## 2021-06-18 DIAGNOSIS — I10 ESSENTIAL HYPERTENSION: ICD-10-CM

## 2021-06-18 DIAGNOSIS — Z00.00 ROUTINE MEDICAL EXAM: ICD-10-CM

## 2021-06-18 DIAGNOSIS — R00.2 PALPITATIONS: ICD-10-CM

## 2021-06-18 DIAGNOSIS — G47.33 OBSTRUCTIVE SLEEP APNEA SYNDROME: ICD-10-CM

## 2021-06-18 DIAGNOSIS — E66.9 OBESITY (BMI 30-39.9): ICD-10-CM

## 2021-06-18 PROCEDURE — 80053 COMPREHEN METABOLIC PANEL: CPT

## 2021-06-18 PROCEDURE — 36415 COLL VENOUS BLD VENIPUNCTURE: CPT

## 2021-06-18 PROCEDURE — 83735 ASSAY OF MAGNESIUM: CPT

## 2021-06-18 PROCEDURE — 80061 LIPID PANEL: CPT

## 2021-06-19 ENCOUNTER — HOSPITAL ENCOUNTER (OUTPATIENT)
Dept: MAMMOGRAPHY | Age: 71
Discharge: HOME | End: 2021-06-19
Attending: FAMILY MEDICINE
Payer: MEDICARE

## 2021-06-19 ENCOUNTER — HOSPITAL ENCOUNTER (OUTPATIENT)
Dept: BONE DENSITY | Age: 71
Discharge: HOME | End: 2021-06-19
Attending: FAMILY MEDICINE
Payer: MEDICARE

## 2021-06-19 DIAGNOSIS — Z12.31 BREAST CANCER SCREENING BY MAMMOGRAM: ICD-10-CM

## 2021-06-19 DIAGNOSIS — Z78.0 POST-MENOPAUSAL: ICD-10-CM

## 2021-06-19 PROCEDURE — 77063 BREAST TOMOSYNTHESIS BI: CPT | Performed by: FAMILY MEDICINE

## 2021-06-19 PROCEDURE — 77067 SCR MAMMO BI INCL CAD: CPT | Performed by: FAMILY MEDICINE

## 2021-06-19 PROCEDURE — 77080 DXA BONE DENSITY AXIAL: CPT | Performed by: FAMILY MEDICINE

## 2021-06-20 DIAGNOSIS — R73.01 ELEVATED FASTING GLUCOSE: Primary | ICD-10-CM

## 2021-06-23 ENCOUNTER — MED REC SCAN ONLY (OUTPATIENT)
Dept: INTEGRATIVE MEDICINE | Facility: CLINIC | Age: 71
End: 2021-06-23

## 2021-06-23 NOTE — TELEPHONE ENCOUNTER
Rcvd results from [facility] for pt's Cologuard testing: Negative  Report placed on provider's desk for review. Results entered into External Result Console. Send to scan when complete.

## 2021-07-19 ENCOUNTER — HOSPITAL ENCOUNTER (OUTPATIENT)
Dept: MAMMOGRAPHY | Facility: HOSPITAL | Age: 71
Discharge: HOME | End: 2021-07-19
Attending: FAMILY MEDICINE
Payer: MEDICARE

## 2021-07-19 ENCOUNTER — HOSPITAL ENCOUNTER (OUTPATIENT)
Dept: ULTRASOUND IMAGING | Facility: HOSPITAL | Age: 71
Discharge: HOME | End: 2021-07-19
Attending: FAMILY MEDICINE
Payer: MEDICARE

## 2021-07-19 DIAGNOSIS — R92.8 ABNORMAL MAMMOGRAM: ICD-10-CM

## 2021-07-19 PROCEDURE — 77065 DX MAMMO INCL CAD UNI: CPT | Performed by: FAMILY MEDICINE

## 2021-07-19 PROCEDURE — 76642 ULTRASOUND BREAST LIMITED: CPT | Performed by: FAMILY MEDICINE

## 2021-07-19 PROCEDURE — 77061 BREAST TOMOSYNTHESIS UNI: CPT | Performed by: FAMILY MEDICINE

## 2021-08-18 ENCOUNTER — APPOINTMENT (OUTPATIENT)
Dept: CARDIOLOGY | Age: 71
End: 2021-08-18

## 2021-09-17 ENCOUNTER — TELEPHONE (OUTPATIENT)
Dept: CARDIOLOGY | Age: 71
End: 2021-09-17

## 2021-11-30 ENCOUNTER — TELEPHONE (OUTPATIENT)
Dept: INTEGRATIVE MEDICINE | Facility: CLINIC | Age: 71
End: 2021-11-30

## 2021-11-30 NOTE — TELEPHONE ENCOUNTER
Patient stated she has been around other individuals who have had \"cold & cough\" symptoms non-covid related.  She was wondering if she was to become ill is there something she could take that does not cause high BP.

## 2021-12-01 NOTE — TELEPHONE ENCOUNTER
I spoke with pt, with person for several hours with cold symptoms. Advised OK to take Coricidin HBP, if needed. Said she is feeling fine, just wants to be prepared should she develop s/s. Also, she takes centrum silver, has 1000 units of D3.  She takes

## 2022-05-21 ENCOUNTER — TELEPHONE (OUTPATIENT)
Dept: INTEGRATIVE MEDICINE | Facility: CLINIC | Age: 72
End: 2022-05-21

## 2022-06-02 ENCOUNTER — LAB ENCOUNTER (OUTPATIENT)
Dept: LAB | Age: 72
End: 2022-06-02
Attending: FAMILY MEDICINE
Payer: MEDICARE

## 2022-06-02 ENCOUNTER — HOSPITAL ENCOUNTER (OUTPATIENT)
Dept: CT IMAGING | Age: 72
Discharge: HOME | End: 2022-06-02
Attending: INTERNAL MEDICINE
Payer: MEDICARE

## 2022-06-02 DIAGNOSIS — Z13.6 ENCOUNTER FOR SCREENING FOR CARDIOVASCULAR DISORDERS: ICD-10-CM

## 2022-06-02 DIAGNOSIS — E78.5 HYPERLIPIDEMIA, UNSPECIFIED HYPERLIPIDEMIA TYPE: ICD-10-CM

## 2022-06-02 DIAGNOSIS — R73.01 ELEVATED FASTING GLUCOSE: ICD-10-CM

## 2022-06-02 LAB
ALT SERPL-CCNC: 28 U/L
AST SERPL-CCNC: 22 U/L (ref 15–37)
CHOLEST SERPL-MCNC: 289 MG/DL (ref ?–200)
EST. AVERAGE GLUCOSE BLD GHB EST-MCNC: 148 MG/DL (ref 68–126)
FASTING PATIENT LIPID ANSWER: YES
HBA1C MFR BLD: 6.8 % (ref ?–5.7)
HDLC SERPL-MCNC: 45 MG/DL (ref 40–59)
LDLC SERPL CALC-MCNC: 203 MG/DL (ref ?–100)
NONHDLC SERPL-MCNC: 244 MG/DL (ref ?–130)
TRIGL SERPL-MCNC: 213 MG/DL (ref 30–149)
VLDLC SERPL CALC-MCNC: 46 MG/DL (ref 0–30)

## 2022-06-02 PROCEDURE — 80061 LIPID PANEL: CPT

## 2022-06-02 PROCEDURE — 36415 COLL VENOUS BLD VENIPUNCTURE: CPT

## 2022-06-02 PROCEDURE — 84450 TRANSFERASE (AST) (SGOT): CPT

## 2022-06-02 PROCEDURE — 83036 HEMOGLOBIN GLYCOSYLATED A1C: CPT

## 2022-06-02 PROCEDURE — 84460 ALANINE AMINO (ALT) (SGPT): CPT

## 2022-06-13 ENCOUNTER — LAB ENCOUNTER (OUTPATIENT)
Dept: LAB | Facility: REFERENCE LAB | Age: 72
End: 2022-06-13
Attending: FAMILY MEDICINE
Payer: MEDICARE

## 2022-06-13 ENCOUNTER — OFFICE VISIT (OUTPATIENT)
Dept: INTEGRATIVE MEDICINE | Facility: CLINIC | Age: 72
End: 2022-06-13
Payer: MEDICARE

## 2022-06-13 VITALS
WEIGHT: 182.81 LBS | HEART RATE: 72 BPM | DIASTOLIC BLOOD PRESSURE: 80 MMHG | SYSTOLIC BLOOD PRESSURE: 154 MMHG | HEIGHT: 60 IN | BODY MASS INDEX: 35.89 KG/M2 | OXYGEN SATURATION: 98 %

## 2022-06-13 DIAGNOSIS — Z78.0 POST-MENOPAUSAL: ICD-10-CM

## 2022-06-13 DIAGNOSIS — R06.00 DOE (DYSPNEA ON EXERTION): ICD-10-CM

## 2022-06-13 DIAGNOSIS — G47.33 OBSTRUCTIVE SLEEP APNEA SYNDROME: ICD-10-CM

## 2022-06-13 DIAGNOSIS — M12.811 RIGHT ROTATOR CUFF TEAR ARTHROPATHY: ICD-10-CM

## 2022-06-13 DIAGNOSIS — I25.10 ATHEROSCLEROSIS OF RIGHT CORONARY ARTERY: ICD-10-CM

## 2022-06-13 DIAGNOSIS — L65.9 HAIR THINNING: ICD-10-CM

## 2022-06-13 DIAGNOSIS — M25.511 CHRONIC RIGHT SHOULDER PAIN: ICD-10-CM

## 2022-06-13 DIAGNOSIS — G89.29 CHRONIC RIGHT SHOULDER PAIN: ICD-10-CM

## 2022-06-13 DIAGNOSIS — R61 NIGHT SWEATS: ICD-10-CM

## 2022-06-13 DIAGNOSIS — E66.9 OBESITY (BMI 30-39.9): Primary | ICD-10-CM

## 2022-06-13 DIAGNOSIS — M75.101 RIGHT ROTATOR CUFF TEAR ARTHROPATHY: ICD-10-CM

## 2022-06-13 DIAGNOSIS — E11.65 TYPE 2 DIABETES MELLITUS WITH HYPERGLYCEMIA, WITHOUT LONG-TERM CURRENT USE OF INSULIN (HCC): ICD-10-CM

## 2022-06-13 DIAGNOSIS — I10 ESSENTIAL HYPERTENSION: ICD-10-CM

## 2022-06-13 DIAGNOSIS — M25.611 LIMITATION OF JOINT MOTION OF RIGHT SHOULDER: ICD-10-CM

## 2022-06-13 DIAGNOSIS — E78.5 HYPERLIPIDEMIA, UNSPECIFIED HYPERLIPIDEMIA TYPE: ICD-10-CM

## 2022-06-13 LAB
ALBUMIN SERPL-MCNC: 4 G/DL (ref 3.4–5)
ALBUMIN/GLOB SERPL: 1 {RATIO} (ref 1–2)
ALP LIVER SERPL-CCNC: 106 U/L
ALT SERPL-CCNC: 27 U/L
ANION GAP SERPL CALC-SCNC: 6 MMOL/L (ref 0–18)
AST SERPL-CCNC: 23 U/L (ref 15–37)
BASOPHILS # BLD AUTO: 0.08 X10(3) UL (ref 0–0.2)
BASOPHILS NFR BLD AUTO: 0.7 %
BILIRUB SERPL-MCNC: 0.4 MG/DL (ref 0.1–2)
BUN BLD-MCNC: 17 MG/DL (ref 7–18)
BUN/CREAT SERPL: 18.9 (ref 10–20)
CALCIUM BLD-MCNC: 9.5 MG/DL (ref 8.5–10.1)
CHLORIDE SERPL-SCNC: 102 MMOL/L (ref 98–112)
CO2 SERPL-SCNC: 28 MMOL/L (ref 21–32)
CREAT BLD-MCNC: 0.9 MG/DL
DEPRECATED RDW RBC AUTO: 45.2 FL (ref 35.1–46.3)
DHEA-S SERPL-MCNC: 110.8 UG/DL
EOSINOPHIL # BLD AUTO: 0.24 X10(3) UL (ref 0–0.7)
EOSINOPHIL NFR BLD AUTO: 2.2 %
ERYTHROCYTE [DISTWIDTH] IN BLOOD BY AUTOMATED COUNT: 12.8 % (ref 11–15)
FASTING STATUS PATIENT QL REPORTED: NO
GLOBULIN PLAS-MCNC: 4.2 G/DL (ref 2.8–4.4)
GLUCOSE BLD-MCNC: 98 MG/DL (ref 70–99)
HCT VFR BLD AUTO: 43.2 %
HGB BLD-MCNC: 13.6 G/DL
IMM GRANULOCYTES # BLD AUTO: 0.04 X10(3) UL (ref 0–1)
IMM GRANULOCYTES NFR BLD: 0.4 %
LYMPHOCYTES # BLD AUTO: 2.32 X10(3) UL (ref 1–4)
LYMPHOCYTES NFR BLD AUTO: 20.8 %
MCH RBC QN AUTO: 30.2 PG (ref 26–34)
MCHC RBC AUTO-ENTMCNC: 31.5 G/DL (ref 31–37)
MCV RBC AUTO: 96 FL
MONOCYTES # BLD AUTO: 0.85 X10(3) UL (ref 0.1–1)
MONOCYTES NFR BLD AUTO: 7.6 %
NEUTROPHILS # BLD AUTO: 7.6 X10 (3) UL (ref 1.5–7.7)
NEUTROPHILS # BLD AUTO: 7.6 X10(3) UL (ref 1.5–7.7)
NEUTROPHILS NFR BLD AUTO: 68.3 %
OSMOLALITY SERPL CALC.SUM OF ELEC: 284 MOSM/KG (ref 275–295)
PLATELET # BLD AUTO: 368 10(3)UL (ref 150–450)
POTASSIUM SERPL-SCNC: 4 MMOL/L (ref 3.5–5.1)
PROGEST SERPL-MCNC: 0.61 NG/ML
PROT SERPL-MCNC: 8.2 G/DL (ref 6.4–8.2)
RBC # BLD AUTO: 4.5 X10(6)UL
SODIUM SERPL-SCNC: 136 MMOL/L (ref 136–145)
TSI SER-ACNC: 1.76 MIU/ML (ref 0.36–3.74)
WBC # BLD AUTO: 11.1 X10(3) UL (ref 4–11)

## 2022-06-13 PROCEDURE — 36415 COLL VENOUS BLD VENIPUNCTURE: CPT

## 2022-06-13 PROCEDURE — 84144 ASSAY OF PROGESTERONE: CPT

## 2022-06-13 PROCEDURE — 82671 ASSAY OF ESTROGENS: CPT

## 2022-06-13 PROCEDURE — 84443 ASSAY THYROID STIM HORMONE: CPT

## 2022-06-13 PROCEDURE — 82627 DEHYDROEPIANDROSTERONE: CPT

## 2022-06-13 PROCEDURE — 84402 ASSAY OF FREE TESTOSTERONE: CPT

## 2022-06-13 PROCEDURE — 85025 COMPLETE CBC W/AUTO DIFF WBC: CPT

## 2022-06-13 PROCEDURE — 99215 OFFICE O/P EST HI 40 MIN: CPT | Performed by: FAMILY MEDICINE

## 2022-06-13 PROCEDURE — 84140 ASSAY OF PREGNENOLONE: CPT

## 2022-06-13 PROCEDURE — 84403 ASSAY OF TOTAL TESTOSTERONE: CPT

## 2022-06-13 PROCEDURE — 80053 COMPREHEN METABOLIC PANEL: CPT

## 2022-06-16 LAB
PREGNENOLONE BY MS/MS, SERUM: 52 NG/DL
TESTOSTERONE, FREE, S: 0.37 NG/DL
TESTOSTERONE, TOTAL, S: 9.8 NG/DL

## 2022-06-27 LAB
ESTRADIOL BY TMS: 4 PG/ML
ESTROGENS TOTAL CALCULATION: 19.1 PG/ML
ESTRONE BY TMS: 15.1 PG/ML

## 2022-07-06 ENCOUNTER — HOSPITAL ENCOUNTER (OUTPATIENT)
Dept: MRI IMAGING | Age: 72
Discharge: HOME | End: 2022-07-06
Attending: FAMILY MEDICINE
Payer: MEDICARE

## 2022-07-06 DIAGNOSIS — G89.29 CHRONIC RIGHT SHOULDER PAIN: ICD-10-CM

## 2022-07-06 DIAGNOSIS — M25.511 CHRONIC RIGHT SHOULDER PAIN: ICD-10-CM

## 2022-07-06 DIAGNOSIS — M12.811 RIGHT ROTATOR CUFF TEAR ARTHROPATHY: ICD-10-CM

## 2022-07-06 DIAGNOSIS — M75.101 RIGHT ROTATOR CUFF TEAR ARTHROPATHY: ICD-10-CM

## 2022-07-06 DIAGNOSIS — M25.611 LIMITATION OF JOINT MOTION OF RIGHT SHOULDER: ICD-10-CM

## 2022-07-06 PROCEDURE — 73221 MRI JOINT UPR EXTREM W/O DYE: CPT | Performed by: FAMILY MEDICINE

## 2022-07-18 ENCOUNTER — TELEPHONE (OUTPATIENT)
Dept: INTEGRATIVE MEDICINE | Facility: CLINIC | Age: 72
End: 2022-07-18

## 2022-07-18 DIAGNOSIS — M12.811 RIGHT ROTATOR CUFF TEAR ARTHROPATHY: Primary | ICD-10-CM

## 2022-07-18 DIAGNOSIS — M75.101 RIGHT ROTATOR CUFF TEAR ARTHROPATHY: Primary | ICD-10-CM

## 2022-07-18 NOTE — TELEPHONE ENCOUNTER
Patient is returning call, she stated she would like to proceed with Physical Therapy. She also has questions about supplements and hormones that were prescribed by Dr Wilfred Bender, also with her detox program. She is concerned because they may conflict with her low HDL and high BP. Please advise regarding which is the most important for her to take.      Thank you

## 2022-07-19 NOTE — TELEPHONE ENCOUNTER
Physical therapy order signed. Will need follow up for all her question. No hormones given to patient at last visit. Does not need immediate follow up but may place in cancellation.

## 2022-08-17 ENCOUNTER — MED REC SCAN ONLY (OUTPATIENT)
Dept: INTEGRATIVE MEDICINE | Facility: CLINIC | Age: 72
End: 2022-08-17

## 2022-09-15 ENCOUNTER — OFFICE VISIT (OUTPATIENT)
Dept: INTEGRATIVE MEDICINE | Facility: CLINIC | Age: 72
End: 2022-09-15
Payer: MEDICARE

## 2022-09-15 VITALS
HEART RATE: 61 BPM | SYSTOLIC BLOOD PRESSURE: 132 MMHG | BODY MASS INDEX: 35 KG/M2 | DIASTOLIC BLOOD PRESSURE: 66 MMHG | WEIGHT: 181 LBS | OXYGEN SATURATION: 96 %

## 2022-09-15 DIAGNOSIS — E78.5 HYPERLIPIDEMIA, UNSPECIFIED HYPERLIPIDEMIA TYPE: ICD-10-CM

## 2022-09-15 DIAGNOSIS — M12.811 RIGHT ROTATOR CUFF TEAR ARTHROPATHY: Primary | ICD-10-CM

## 2022-09-15 DIAGNOSIS — E11.65 TYPE 2 DIABETES MELLITUS WITH HYPERGLYCEMIA, WITHOUT LONG-TERM CURRENT USE OF INSULIN (HCC): ICD-10-CM

## 2022-09-15 DIAGNOSIS — M75.101 RIGHT ROTATOR CUFF TEAR ARTHROPATHY: Primary | ICD-10-CM

## 2022-09-15 PROBLEM — E66.01 MORBID (SEVERE) OBESITY DUE TO EXCESS CALORIES (HCC): Status: ACTIVE | Noted: 2022-09-15

## 2022-09-15 PROCEDURE — 1126F AMNT PAIN NOTED NONE PRSNT: CPT | Performed by: FAMILY MEDICINE

## 2022-09-15 PROCEDURE — 99214 OFFICE O/P EST MOD 30 MIN: CPT | Performed by: FAMILY MEDICINE

## 2022-09-15 RX ORDER — BIOTIN 1 MG
1 TABLET ORAL DAILY
COMMUNITY

## 2022-09-27 ENCOUNTER — TELEPHONE (OUTPATIENT)
Dept: INTEGRATIVE MEDICINE | Facility: CLINIC | Age: 72
End: 2022-09-27

## 2022-09-27 NOTE — TELEPHONE ENCOUNTER
Pt unable to find Metabolic Extra on website Ayala Jerome suggested. Wants to know where she can purchase supplements.

## 2022-09-27 NOTE — TELEPHONE ENCOUNTER
Pt sending [employer] Message re: inability to locate the supplement online that Dr Khadijah Mcdonald suggested. Message routed to Dr Khadijah Mcdonald for alternative supplement or source for previously suggested supplement.

## 2022-09-28 ENCOUNTER — TELEPHONE (OUTPATIENT)
Dept: PHYSICAL THERAPY | Facility: HOSPITAL | Age: 72
End: 2022-09-28

## 2022-10-17 ENCOUNTER — OFFICE VISIT (OUTPATIENT)
Dept: PHYSICAL THERAPY | Age: 72
End: 2022-10-17
Attending: FAMILY MEDICINE
Payer: MEDICARE

## 2022-10-17 DIAGNOSIS — M12.811 RIGHT ROTATOR CUFF TEAR ARTHROPATHY: ICD-10-CM

## 2022-10-17 DIAGNOSIS — M75.101 RIGHT ROTATOR CUFF TEAR ARTHROPATHY: ICD-10-CM

## 2022-10-17 PROCEDURE — 97110 THERAPEUTIC EXERCISES: CPT

## 2022-10-17 PROCEDURE — 97161 PT EVAL LOW COMPLEX 20 MIN: CPT

## 2022-10-21 ENCOUNTER — OFFICE VISIT (OUTPATIENT)
Dept: PHYSICAL THERAPY | Age: 72
End: 2022-10-21
Attending: FAMILY MEDICINE
Payer: MEDICARE

## 2022-10-21 PROCEDURE — 97110 THERAPEUTIC EXERCISES: CPT

## 2022-10-21 PROCEDURE — 97112 NEUROMUSCULAR REEDUCATION: CPT

## 2022-10-21 NOTE — PROGRESS NOTES
Diagnosis:   Right rotator cuff tear arthropathy (M75.101, M12.811)   Referring Provider: Ani Allred  Date of Evaluation:   10/17/2022    Precautions:  None Next MD visit:   none scheduled  Date of Surgery: n/a   Insurance Primary/Secondary: MEDICARE / COMMERCIAL     # Auth Visits: 12 visits, 10 auth until 1/15/2023            Subjective: Patient reports that she ws sore after she did more reps with the supine flexion in the upper arm and then into the arm pit. She did feel better after her initial evaluation. She has the hardest time with the ER stretches. Pain: 0/10      Objective: See treatment log        Assessment: Progressed ther ex to increase ROM this session; she does have restrictions noted in the capsular pattern and is the most restricted with ER. Added isometric strengthening to help improve her [address] joint stability as needed to improve her pain free functional reach; needs reinforcement        Goals:   Goals: (to be met in 12 visits)   1. Patient will be independent in a progressive HEP to help manage symptoms and achieve functional independence in 3 sessions. 2. Patient will decrease his max pain to 3/10 as needed to improve his functional independence in 3 weeks. 3. Patient will increase her shoulder flexion range to 150 deg or more as needed to reach as needed to grocery shop and put away items  4. Patient will increase her shoulder abduction range to 120 deg or more as needed to improve her functional reach for donning a jacket  5. Patient will increase her shoulder flexion strength to 4/5 as needed to return to carrying groceries while shopping in the community. 6. Patient will increase her shoulder strength to grossly greater than 4-/5 or greater as needed to load and unload her car. 7. Patient will be independent in sleep positioning modifications to decrease pain at night by 50%.       Plan: Continue with range of motion and flexibility to improve her functional mobility and reach; progress with pulleys, table stretches and progress isometrics for HEP   Date: 10/21/2022  TX#: 2/12 Date:                 TX#: 3/ Date:                 TX#: 4/ Date:                 TX#: 5/   Man       Ther ex  Right GH joint inferior and posterior glides with long axis distraction grade II-III   PROM right shoulder flexion, scaption, abduction and ER  Supine cane flexion 2 x 10   Seated cane ER 2 x 10  Standing cane abduction 2 x 10   Wall slides into flexion x 15  Wall slides into abduction x 10   OSB flexion and scaption AAROM x 15 each       NMR Scap retraction x15, 5\"  Doorway pec stretch 3 x 15\"    Shoulder isometric ER x 10, 5\"  Shoulder isometric IR x 10, 5\"      Ther act           HEP: [address] [facility] [address] [[id number]]  Abduction wall slides -  Repeat 10 Times, Complete 1 Set, Perform 2 Times a Day  PECTORALIS DOORWAY STRETCH - LOW -  Repeat 3 Times, Hold 15 Seconds, Complete 1 Set, Perform 1 Times a Day    Charges: 2 ther ex, 1 NMR      Total Timed Treatment: 40 min  Total Treatment Time: 41 min      Certification From: 58/29/3766  To:1/15/2023

## 2022-10-24 ENCOUNTER — OFFICE VISIT (OUTPATIENT)
Dept: PHYSICAL THERAPY | Age: 72
End: 2022-10-24
Attending: FAMILY MEDICINE
Payer: MEDICARE

## 2022-10-24 PROCEDURE — 97110 THERAPEUTIC EXERCISES: CPT

## 2022-10-24 PROCEDURE — 97112 NEUROMUSCULAR REEDUCATION: CPT

## 2022-10-24 NOTE — PROGRESS NOTES
Diagnosis:   Right rotator cuff tear arthropathy (M75.101, M12.811)   Referring Provider: Faheem Fajardo  Date of Evaluation:   10/17/2022    Precautions:  None Next MD visit:   none scheduled  Date of Surgery: n/a   Insurance Primary/Secondary: MEDICARE / COMMERCIAL     # Auth Visits: 12 visits, 10 auth until 1/15/2023            Subjective: Patient reports that she really did a lot of yard work over the weekend so she has some intermittent anterior shoulder pain and also pain in the top of the shoulder and throughout the back of the shoulder. She did use some ice to manage pain in the shoulder. She does feel that she has lost some motion because her pain is more at baseline and with activity     Pain: 0/10; 6/10      Objective: See treatment log  AROM: (* denotes performed with pain)  Shoulder    Flexion: R 140 (125*); L 170  Abduction: R 110 (88*), L170  ER: R 30 (15*); L 73  IR: R L4*; L T10         Assessment:  Patient is demonstrating gains in her mobility since beginning therapy. Progressed wit the pulleys this visit to reinforce her end range of motion and improve her capsular mobility as that will help to improve her functional reach; needs reinforcement to allow for less pain with ADLS. Goals:   Goals: (to be met in 12 visits)   1. Patient will be independent in a progressive HEP to help manage symptoms and achieve functional independence in 3 sessions. 2. Patient will decrease his max pain to 3/10 as needed to improve his functional independence in 3 weeks. 3. Patient will increase her shoulder flexion range to 150 deg or more as needed to reach as needed to grocery shop and put away items  4. Patient will increase her shoulder abduction range to 120 deg or more as needed to improve her functional reach for donning a jacket  5. Patient will increase her shoulder flexion strength to 4/5 as needed to return to carrying groceries while shopping in the community.    6. Patient will increase her shoulder strength to grossly greater than 4-/5 or greater as needed to load and unload her car. 7. Patient will be independent in sleep positioning modifications to decrease pain at night by 50%. Plan: Continue with range of motion and flexibility to improve her functional mobility and reach; progress with table stretches and trial ER/IR walkouts next visit.     Date: 10/21/2022  TX#: 2/12 Date:    10/24/2022             TX#: 3/12 Date:                 TX#: 4/ Date:                 TX#: 5/   Man       Ther ex  Right GH joint inferior and posterior glides with long axis distraction grade II-III   PROM right shoulder flexion, scaption, abduction and ER  Supine cane flexion 2 x 10   Seated cane ER 2 x 10  Standing cane abduction 2 x 10   Wall slides into flexion x 15  Wall slides into abduction x 10   OSB flexion and scaption AAROM x 15 each  Right GH joint inferior and posterior glides with long axis distraction grade II-III   PROM right shoulder flexion, scaption, abduction and ER  Supine cane flexion 2 x 10   Seated cane ER 2 x 10  Standing cane abduction 2 x 10   Wall slides into flexion x 15  Wall slides into abduction x 10   OSB flexion and scaption AAROM x 15 each   Pulleys: 1 min each flexion and scaption     NMR Scap retraction x15, 5\"  Doorway pec stretch 3 x 15\"    Shoulder isometric ER x 10, 5\"  Shoulder isometric IR x 10, 5\" Scap retraction x15, 5\"  Doorway pec stretch 3 x 15\"    Shoulder isometric ER x 10, 5\"  Shoulder isometric IR x 10, 5\"     Ther act           HEP: Not progressed this visit    Charges: 2 ther ex, 1 NMR      Total Timed Treatment: 40 min  Total Treatment Time: 41 min      Certification From: 64/32/0537  To:1/15/2023

## 2022-10-28 ENCOUNTER — OFFICE VISIT (OUTPATIENT)
Dept: PHYSICAL THERAPY | Age: 72
End: 2022-10-28
Attending: FAMILY MEDICINE
Payer: MEDICARE

## 2022-10-28 PROCEDURE — 97110 THERAPEUTIC EXERCISES: CPT

## 2022-10-28 PROCEDURE — 97112 NEUROMUSCULAR REEDUCATION: CPT

## 2022-10-28 NOTE — PROGRESS NOTES
Diagnosis:   Right rotator cuff tear arthropathy (M75.101, M12.811)   Referring Provider: Dorene Eamnuel  Date of Evaluation:   10/17/2022    Precautions:  None Next MD visit:   none scheduled  Date of Surgery: n/a   Insurance Primary/Secondary: MEDICARE / COMMERCIAL     # Auth Visits: 12 visits, 10 auth until 1/15/2023            Subjective: Patient reports that she has been a little bit more sore but she has been doing more lifting because she was helping her mother with her wheelchair. Today most of the pain is at the anterior shoulder; this is better than Monday    Pain: 0/10; 6/10      Objective: See treatment log  AROM: (* denotes performed with pain)  Shoulder    Flexion: R 140 (125*); L 170  Abduction: R 110 (88*), L170  ER: R 30 (15*); L 73  IR: R L4*; L T10         Assessment:  Continued with range of motion progression this session as needed to improve her pain free fictional reach. She had better passive range into all directions; however, she continue to have capsular stiffness causing losses in the capsular pattern. Need to continue with flexibility and initiate additional shoulder strengthening to help improve her scapulohumeral rhythm as her shoulder mobility continues to improve. Goals:   Goals: (to be met in 12 visits)   1. Patient will be independent in a progressive HEP to help manage symptoms and achieve functional independence in 3 sessions. 2. Patient will decrease his max pain to 3/10 as needed to improve his functional independence in 3 weeks. 3. Patient will increase her shoulder flexion range to 150 deg or more as needed to reach as needed to grocery shop and put away items  4. Patient will increase her shoulder abduction range to 120 deg or more as needed to improve her functional reach for donning a jacket  5. Patient will increase her shoulder flexion strength to 4/5 as needed to return to carrying groceries while shopping in the community.    6. Patient will increase her shoulder strength to grossly greater than 4-/5 or greater as needed to load and unload her car. 7. Patient will be independent in sleep positioning modifications to decrease pain at night by 50%. Plan: Continue with range of motion and flexibility to improve her functional mobility and reach with ER/IR walkouts next visit.     Date: 10/21/2022  TX#: 2/12 Date:    10/24/2022             TX#: 3/12 Date:   10/28/2022              TX#: 4/12 Date:                 TX#: 5/   Man       Ther ex  Right GH joint inferior and posterior glides with long axis distraction grade II-III   PROM right shoulder flexion, scaption, abduction and ER  Supine cane flexion 2 x 10   Seated cane ER 2 x 10  Standing cane abduction 2 x 10   Wall slides into flexion x 15  Wall slides into abduction x 10   OSB flexion and scaption AAROM x 15 each  Right GH joint inferior and posterior glides with long axis distraction grade II-III   PROM right shoulder flexion, scaption, abduction and ER  Supine cane flexion 2 x 10   Seated cane ER 2 x 10  Standing cane abduction 2 x 10   Wall slides into flexion x 15  Wall slides into abduction x 10   OSB flexion and scaption AAROM x 15 each   Pulleys: 1 min each flexion and scaption Right GH joint inferior and posterior glides with long axis distraction grade II-III   PROM right shoulder flexion, scaption, abduction and ER  Supine cane flexion 2 x 10   Seated cane ER 2 x 10  Standing cane abduction 2 x 10   Wall slides into flexion x 15  Wall slides into abduction x 10   OSB flexion and scaption AAROM x 15 each   Table ER stretch x 10, 3\"  Pulleys: 1 min each flexion and scaption    NMR Scap retraction x15, 5\"  Doorway pec stretch 3 x 15\"    Shoulder isometric ER x 10, 5\"  Shoulder isometric IR x 10, 5\" Scap retraction x15, 5\"  Doorway pec stretch 3 x 15\"    Shoulder isometric ER x 10, 5\"  Shoulder isometric IR x 10, 5\" Scap retraction x15, 5\"  Doorway pec stretch 3 x 15\"    Shoulder isometric ER x 10, 5\"  Shoulder isometric IR x 10, 5\"    Ther act           HEP: HOME EXERCISE PROGRAM [[id number]]  SHOULDER - ISOMETRIC EXTERNAl ROTATION -  Repeat 10 Times, Hold 5 Seconds, Complete 1 Set, Perform 1 Times a Day  SHOULDER - ISOMETRIC INTERNAL ROTATION  -  Repeat 10 Times, Hold 5 Seconds, Complete 1 Set, Perform 1 Times a Day  Shoulder External rotation stretch with table -  Repeat 10 Times, Hold 3 Seconds,    Charges: 2 ther ex, 1 NMR      Total Timed Treatment: 40 min  Total Treatment Time: 40 min      Certification From: 48/31/6579  To:1/15/2023

## 2022-10-31 ENCOUNTER — OFFICE VISIT (OUTPATIENT)
Dept: PHYSICAL THERAPY | Age: 72
End: 2022-10-31
Attending: FAMILY MEDICINE
Payer: MEDICARE

## 2022-10-31 PROCEDURE — 97110 THERAPEUTIC EXERCISES: CPT

## 2022-10-31 PROCEDURE — 97112 NEUROMUSCULAR REEDUCATION: CPT

## 2022-10-31 NOTE — PROGRESS NOTES
Diagnosis:   Right rotator cuff tear arthropathy (M75.101, M12.811)   Referring Provider: Lindy Nielson  Date of Evaluation:   10/17/2022    Precautions:  None Next MD visit:   none scheduled  Date of Surgery: n/a   Insurance Primary/Secondary: MEDICARE / COMMERCIAL     # Auth Visits: 12 visits, 10 auth until 1/15/2023            Subjective: Patient reports that she had some lateral shoulder and anterior shoulder and lateral neck pain when her shoulder was bothering her this weekend. She can drive without increased pain. She still has the most difficulty with carrying items on the right. She has been moving some furniture around and she has to use the left side more. She can reach into her microwave easier than before. Pain: 3/10      Objective: See treatment log  AROM: (* denotes performed with pain)  10/24/22  Shoulder    Flexion: R 140 (125*); L 170  Abduction: R 110 (88*), L170  ER: R 30 (15*); L 73  IR: R L4*; L T10         Assessment:  Patient did ave some minimal gains into flexion this session noted in supine. Focused this session on ROM and progress with ER and IR walkouts to progress her RTC strength as needed to supprot the shoulder for return to reaching and lifting during ADLS. Need to continue with end range ER and continue with strengthening and postural re-ed to help promote improved shoulder mechanics for decreased pain with reaching. Goals:   Goals: (to be met in 12 visits)   1. Patient will be independent in a progressive HEP to help manage symptoms and achieve functional independence in 3 sessions. 2. Patient will decrease his max pain to 3/10 as needed to improve his functional independence in 3 weeks. 3. Patient will increase her shoulder flexion range to 150 deg or more as needed to reach as needed to grocery shop and put away items  4. Patient will increase her shoulder abduction range to 120 deg or more as needed to improve her functional reach for donning a jacket  5.  Patient will increase her shoulder flexion strength to 4/5 as needed to return to carrying groceries while shopping in the community. 6. Patient will increase her shoulder strength to grossly greater than 4-/5 or greater as needed to load and unload her car. 7. Patient will be independent in sleep positioning modifications to decrease pain at night by 50%. Plan: Continue with range of motion and flexibility to improve her functional mobility and reach with rows and doorway ER stretch as tolerated.    Date:    10/24/2022             TX#: 3/12 Date:   10/28/2022              TX#: 4/12 Date: 10/31/2022                 TX#: 5/12   Man      Ther ex  Right GH joint inferior and posterior glides with long axis distraction grade II-III   PROM right shoulder flexion, scaption, abduction and ER  Supine cane flexion 2 x 10   Seated cane ER 2 x 10  Standing cane abduction 2 x 10   Wall slides into flexion x 15  Wall slides into abduction x 10   OSB flexion and scaption AAROM x 15 each   Pulleys: 1 min each flexion and scaption Right GH joint inferior and posterior glides with long axis distraction grade II-III   PROM right shoulder flexion, scaption, abduction and ER  Supine cane flexion 2 x 10   Seated cane ER 2 x 10  Standing cane abduction 2 x 10   Wall slides into flexion x 15  Wall slides into abduction x 10   OSB flexion and scaption AAROM x 15 each   Table ER stretch x 10, 3\"  Pulleys: 1 min each flexion and scaption Right GH joint inferior and posterior glides with long axis distraction grade II-III   PROM right shoulder flexion, scaption, abduction and ER  Supine cane flexion 2 x 10   Seated cane ER 2 x 10  Standing cane abduction 2 x 10   Wall slides into flexion x 15  Wall slides into abduction x 10   OSB flexion and scaption AAROM x 15 each   Table ER stretch x 10, 3\"  Pulleys: 1 min each flexion and scaption   NMR Scap retraction x15, 5\"  Doorway pec stretch 3 x 15\"    Shoulder isometric ER x 10, 5\"  Shoulder isometric IR x 10, 5\" Scap retraction x15, 5\"  Doorway pec stretch 3 x 15\"    Shoulder isometric ER x 10, 5\"  Shoulder isometric IR x 10, 5\" Scap retraction x15, 5\"  Doorway pec stretch 3 x 15\"    Shoulder isometric ER x 10, 5\"  Shoulder isometric IR x 10, 5\"   Ther act          HEP: not progressed this session    Charges: 2 ther ex, 1 NMR      Total Timed Treatment: 40 min  Total Treatment Time: 40 min      Certification From: 35/89/9461  To:1/15/2023

## 2022-11-04 ENCOUNTER — OFFICE VISIT (OUTPATIENT)
Dept: PHYSICAL THERAPY | Age: 72
End: 2022-11-04
Attending: FAMILY MEDICINE
Payer: MEDICARE

## 2022-11-04 PROCEDURE — 97110 THERAPEUTIC EXERCISES: CPT

## 2022-11-04 PROCEDURE — 97112 NEUROMUSCULAR REEDUCATION: CPT

## 2022-11-04 NOTE — PROGRESS NOTES
Diagnosis:   Right rotator cuff tear arthropathy (M75.101, M12.811)   Referring Provider: Jaymie Mustafa  Date of Evaluation:   10/17/2022    Precautions:  None Next MD visit:   none scheduled  Date of Surgery: n/a   Insurance Primary/Secondary: MEDICARE / COMMERCIAL     # Auth Visits: 12 visits, 10 auth until 1/15/2023            Subjective: Patient reports that all of her pain is in the upper arm. Her pain is a little bit more today but there is less ain in the shoulder than thel arm. She did a lot of moving of boxes and items so her overall arm/shoulder is feeling a little bit more painful    Pain: 3/10      Objective: See treatment log  AROM: (* denotes performed with pain)  11/4//22  Shoulder    Flexion: R 150 (140*); L 170  Abduction: R 115 (110*), L170  ER: R 40* (30*); L 73  IR: R L4*; L T10         Assessment:  The patient did have more pain in the upper arm with end range passive stretching this visit, but other range of motion exercises did not increate the upper arm pain. Progressed with rows this visit as needed to improve her periscapular strength and scapulothoracic mobility to help with her biomechanics as her [facility] mobility continues to improve; needs reinforcement       Goals:   Goals: (to be met in 12 visits)   1. Patient will be independent in a progressive HEP to help manage symptoms and achieve functional independence in 3 sessions. 2. Patient will decrease his max pain to 3/10 as needed to improve his functional independence in 3 weeks. 3. Patient will increase her shoulder flexion range to 150 deg or more as needed to reach as needed to grocery shop and put away items  4. Patient will increase her shoulder abduction range to 120 deg or more as needed to improve her functional reach for donning a jacket  5. Patient will increase her shoulder flexion strength to 4/5 as needed to return to carrying groceries while shopping in the community.    6. Patient will increase her shoulder strength to grossly greater than 4-/5 or greater as needed to load and unload her car. 7. Patient will be independent in sleep positioning modifications to decrease pain at night by 50%. Plan: Continue with range of motion and flexibility to improve her functional mobility and reach with ER/IR walkouts and doorway ER stretch as tolerated.    Date:   10/28/2022              TX#: 4/12 Date: 10/31/2022                 TX#: 5/12 Date: 11/04/2022                 TX#: 6/12   Man      Ther ex  Right GH joint inferior and posterior glides with long axis distraction grade II-III   PROM right shoulder flexion, scaption, abduction and ER  Supine cane flexion 2 x 10   Seated cane ER 2 x 10  Standing cane abduction 2 x 10   Wall slides into flexion x 15  Wall slides into abduction x 10   OSB flexion and scaption AAROM x 15 each   Table ER stretch x 10, 3\"  Pulleys: 1 min each flexion and scaption Right GH joint inferior and posterior glides with long axis distraction grade II-III   PROM right shoulder flexion, scaption, abduction and ER  Supine cane flexion 2 x 10   Seated cane ER 2 x 10  Standing cane abduction 2 x 10   Wall slides into flexion x 15  Wall slides into abduction x 10   OSB flexion and scaption AAROM x 15 each   Table ER stretch x 10, 3\"  Pulleys: 1 min each flexion and scaption Right GH joint inferior and posterior glides with long axis distraction grade II-III   PROM right shoulder flexion, scaption, abduction and ER  Supine cane flexion 2 x 10   Seated cane ER 2 x 10  Standing cane abduction 2 x 10   Wall slides into flexion x 15  Wall slides into abduction x 10   OSB flexion and scaption AAROM x 15 each   Table ER stretch x 10, 3\"  Pulleys: 1 min each flexion and scaption  Rows 2 x 15 GTB    NMR Scap retraction x15, 5\"  Doorway pec stretch 3 x 15\"    Shoulder isometric ER x 10, 5\"  Shoulder isometric IR x 10, 5\" Scap retraction x15, 5\"  Doorway pec stretch 3 x 15\"    Shoulder isometric ER x 10, 5\"  Shoulder isometric IR x 10, 5\" Scap retraction x15, 5\"  Doorway pec stretch 3 x 15\"    Shoulder isometric ER x 10, 5\"  Shoulder isometric IR x 10, 5\"   Ther act          HEP: rows    Charges: 2 ther ex, 1 NMR      Total Timed Treatment: 40 min  Total Treatment Time: 40 min      Certification From: 44/89/7383  To:1/15/2023

## 2022-11-07 ENCOUNTER — OFFICE VISIT (OUTPATIENT)
Dept: PHYSICAL THERAPY | Age: 72
End: 2022-11-07
Attending: FAMILY MEDICINE
Payer: MEDICARE

## 2022-11-07 PROCEDURE — 97110 THERAPEUTIC EXERCISES: CPT

## 2022-11-07 PROCEDURE — 97112 NEUROMUSCULAR REEDUCATION: CPT

## 2022-11-07 NOTE — PROGRESS NOTES
Diagnosis:   Right rotator cuff tear arthropathy (M75.101, M12.811)   Referring Provider: No ref. provider found  Date of Evaluation:   10/17/2022    Precautions:  None Next MD visit:   none scheduled  Date of Surgery: n/a   Insurance Primary/Secondary: MEDICARE / COMMERCIAL     # Auth Visits: 12 visits, 10 auth until 1/15/2023            Subjective: Patient reports that she was cleaning her CPAP and when she grabbed a bottle of water to clean she has more pain int he top of the shoulder. She is noticing that she can reach further than she could before with less pain     Pain: 4/10      Objective: See treatment log  AROM: (* denotes performed with pain)  11/4//22  Shoulder    Flexion: R 150 (140*); L 170  Abduction: R 115 (110*), L170  ER: R 40* (30*); L 73  IR: R L4*; L T10         Assessment:  The patient continues to have losses in her motion in the capsular pattern, but she is demonstrating less pain with stretching into ER as her capsular mobility is improving. Progressed with ER and IR walkouts and ER stretch to improve her end range mobility and also her strength as needed to control shoulder stresses with active reaching. Need to continue with end range mobility and progress strength improve her shoulder mechanics with ADLS. Goals:   Goals: (to be met in 12 visits)   1. Patient will be independent in a progressive HEP to help manage symptoms and achieve functional independence in 3 sessions. 2. Patient will decrease his max pain to 3/10 as needed to improve his functional independence in 3 weeks. 3. Patient will increase her shoulder flexion range to 150 deg or more as needed to reach as needed to grocery shop and put away items  4. Patient will increase her shoulder abduction range to 120 deg or more as needed to improve her functional reach for donning a jacket  5. Patient will increase her shoulder flexion strength to 4/5 as needed to return to carrying groceries while shopping in the community. 6. Patient will increase her shoulder strength to grossly greater than 4-/5 or greater as needed to load and unload her car. 7. Patient will be independent in sleep positioning modifications to decrease pain at night by 50%.       Plan: Continue with range of motion and flexibility to improve her functional mobility and reach with progression of resisted shoulder extension and B ER with retraction   Date: 10/31/2022                 TX#: 5/12 Date: 11/04/2022                 TX#: 6/12 Date: 11/07/2022                 TX#: 7/12   Man      Ther ex  Right GH joint inferior and posterior glides with long axis distraction grade II-III   PROM right shoulder flexion, scaption, abduction and ER  Supine cane flexion 2 x 10   Seated cane ER 2 x 10  Standing cane abduction 2 x 10   Wall slides into flexion x 15  Wall slides into abduction x 10   OSB flexion and scaption AAROM x 15 each   Table ER stretch x 10, 3\"  Pulleys: 1 min each flexion and scaption Right GH joint inferior and posterior glides with long axis distraction grade II-III   PROM right shoulder flexion, scaption, abduction and ER  Supine cane flexion 2 x 10   Seated cane ER 2 x 10  Standing cane abduction 2 x 10   Wall slides into flexion x 15  Wall slides into abduction x 10   OSB flexion and scaption AAROM x 15 each   Table ER stretch x 10, 3\"  Pulleys: 1 min each flexion and scaption  Rows 2 x 15 GTB  Right GH joint inferior and posterior glides with long axis distraction grade II-III   PROM right shoulder flexion, scaption, abduction and ER  Supine cane flexion 2 x 10   Seated cane ER 2 x 10  Standing cane abduction 2 x 10   Wall slides into flexion x 15  Wall slides into abduction x 10   OSB flexion and scaption AAROM x 15 each   Table ER stretch x 10, 3\"  Pulleys: 1 min each flexion and scaption  Rows 2 x 15 GTB   Standing ER stretch x 10, 3\"   NMR Scap retraction x15, 5\"  Doorway pec stretch 3 x 15\"    Shoulder isometric ER x 10, 5\"  Shoulder isometric IR x 10, 5\" Scap retraction x15, 5\"  Doorway pec stretch 3 x 15\"    Shoulder isometric ER x 10, 5\"  Shoulder isometric IR x 10, 5\" Scap retraction x15, 5\"  Doorway pec stretch 3 x 15\"    ER walkout x 10, YTB  IR walkout x 10, YTB     Ther act          HEP: ER and IR walkouts     Charges: 2 ther ex, 1 NMR      Total Timed Treatment: 39 min  Total Treatment Time: 40 min      Certification From: 66/44/1354  To:1/15/2023

## 2022-11-10 ENCOUNTER — TELEPHONE (OUTPATIENT)
Dept: PHYSICAL THERAPY | Facility: HOSPITAL | Age: 72
End: 2022-11-10

## 2022-11-11 ENCOUNTER — APPOINTMENT (OUTPATIENT)
Dept: PHYSICAL THERAPY | Age: 72
End: 2022-11-11
Attending: FAMILY MEDICINE
Payer: MEDICARE

## 2022-11-15 ENCOUNTER — TELEPHONE (OUTPATIENT)
Dept: INTEGRATIVE MEDICINE | Facility: CLINIC | Age: 72
End: 2022-11-15

## 2022-11-15 ENCOUNTER — OFFICE VISIT (OUTPATIENT)
Dept: PHYSICAL THERAPY | Age: 72
End: 2022-11-15
Attending: FAMILY MEDICINE
Payer: MEDICARE

## 2022-11-15 PROCEDURE — 97110 THERAPEUTIC EXERCISES: CPT

## 2022-11-15 PROCEDURE — 97112 NEUROMUSCULAR REEDUCATION: CPT

## 2022-11-15 NOTE — PROGRESS NOTES
Diagnosis:   Right rotator cuff tear arthropathy (M75.101, M12.811)   Referring Provider: Coleen Ridley Date of Evaluation:   10/17/2022    Precautions:  None Next MD visit:   none scheduled  Date of Surgery: n/a   Insurance Primary/Secondary: MEDICARE / COMMERCIAL     # Auth Visits: 12 visits, 10 auth until 1/15/2023            Subjective: Patient reports that she is having very little shoulder pain today; she has more soreness in other places. She does have more pain in the shoulder with reaching out to the side     Pain:1-2/10      Objective: See treatment log  AROM: (* denotes performed with pain)  11/4//22  Shoulder    Flexion: R 156 (150*); L 170  Abduction: R 125 (115*), L170  ER: R 45* (40*); L 73  IR: R L2 (L4*); L T10         Assessment:  The patient is making gains in her range of motion into all planes; however, she does have losses in the capsular pattern. Progressed this session with ER with retraction as that will help to improve her periscapular and RTC strengths which will improve her shoulder and UE mechanism as her available range of motion progresses. Goals:   Goals: (to be met in 12 visits)   1. Patient will be independent in a progressive HEP to help manage symptoms and achieve functional independence in 3 sessions. 2. Patient will decrease his max pain to 3/10 as needed to improve his functional independence in 3 weeks. 3. Patient will increase her shoulder flexion range to 150 deg or more as needed to reach as needed to grocery shop and put away items  4. Patient will increase her shoulder abduction range to 120 deg or more as needed to improve her functional reach for donning a jacket  5. Patient will increase her shoulder flexion strength to 4/5 as needed to return to carrying groceries while shopping in the community. 6. Patient will increase her shoulder strength to grossly greater than 4-/5 or greater as needed to load and unload her car.    7. Patient will be independent in sleep positioning modifications to decrease pain at night by 50%. Plan: Continue with range of motion and flexibility to improve her functional mobility and reach with resisted shoulder extension.    Date: 10/31/2022                 TX#: 5/12 Date: 11/04/2022                 TX#: 6/12 Date: 11/07/2022                 TX#: 7/12 Date: 11/15/2022                 TX#: 8/12   Man       Ther ex  Right GH joint inferior and posterior glides with long axis distraction grade II-III   PROM right shoulder flexion, scaption, abduction and ER  Supine cane flexion 2 x 10   Seated cane ER 2 x 10  Standing cane abduction 2 x 10   Wall slides into flexion x 15  Wall slides into abduction x 10   OSB flexion and scaption AAROM x 15 each   Table ER stretch x 10, 3\"  Pulleys: 1 min each flexion and scaption Right GH joint inferior and posterior glides with long axis distraction grade II-III   PROM right shoulder flexion, scaption, abduction and ER  Supine cane flexion 2 x 10   Seated cane ER 2 x 10  Standing cane abduction 2 x 10   Wall slides into flexion x 15  Wall slides into abduction x 10   OSB flexion and scaption AAROM x 15 each   Table ER stretch x 10, 3\"  Pulleys: 1 min each flexion and scaption  Rows 2 x 15 GTB  Right GH joint inferior and posterior glides with long axis distraction grade II-III   PROM right shoulder flexion, scaption, abduction and ER  Supine cane flexion 2 x 10   Seated cane ER 2 x 10  Standing cane abduction 2 x 10   Wall slides into flexion x 15  Wall slides into abduction x 10   OSB flexion and scaption AAROM x 15 each   Table ER stretch x 10, 3\"  Pulleys: 1 min each flexion and scaption  Rows 2 x 15 GTB   Standing ER stretch x 10, 3\" Right GH joint inferior and posterior glides with long axis distraction grade II-III   PROM right shoulder flexion, scaption, abduction and ER  Supine cane flexion 2 x 10   Seated cane ER 2 x 10  Standing cane abduction 2 x 10   Wall slides into flexion x 15  Wall slides into abduction x 10   OSB flexion and scaption AAROM x 15 each   Table ER stretch x 10, 3\"  Pulleys: 1 min each flexion and scaption  Rows 2 x 15 GTB   Standing ER stretch x 10, 3\"   NMR Scap retraction x15, 5\"  Doorway pec stretch 3 x 15\"    Shoulder isometric ER x 10, 5\"  Shoulder isometric IR x 10, 5\" Scap retraction x15, 5\"  Doorway pec stretch 3 x 15\"    Shoulder isometric ER x 10, 5\"  Shoulder isometric IR x 10, 5\" Scap retraction x15, 5\"  Doorway pec stretch 3 x 15\"    ER walkout x 10, YTB  IR walkout x 10, YTB   Scap retraction x15, 5\" ND  ER with retraction 2 x 10 YTB  Doorway pec stretch 3 x 15\"    ER walkout x 10, YTB  IR walkout x 10, YTB   Ther act           HEP: not progressed this visit.     Charges: 2 ther ex, 1 NMR      Total Timed Treatment: 40 min  Total Treatment Time: 40 min      Certification From: 82/84/5228  To:1/15/2023

## 2022-11-16 ENCOUNTER — MED REC SCAN ONLY (OUTPATIENT)
Dept: INTEGRATIVE MEDICINE | Facility: CLINIC | Age: 72
End: 2022-11-16

## 2022-11-18 ENCOUNTER — OFFICE VISIT (OUTPATIENT)
Dept: PHYSICAL THERAPY | Age: 72
End: 2022-11-18
Attending: FAMILY MEDICINE
Payer: MEDICARE

## 2022-11-18 PROCEDURE — 97112 NEUROMUSCULAR REEDUCATION: CPT

## 2022-11-18 PROCEDURE — 97110 THERAPEUTIC EXERCISES: CPT

## 2022-11-18 NOTE — PROGRESS NOTES
ProgressSummary  Pt has attended 9 visits in Physical Therapy. Diagnosis:   Right rotator cuff tear arthropathy (M75.101, M12.811)   Referring Provider: Surendra Rodrigez Date of Evaluation:   10/17/2022    Precautions:  None Next MD visit:   none scheduled  Date of Surgery: n/a   Insurance Primary/Secondary: MEDICARE / COMMERCIAL     # Auth Visits: 12 visits, 10 auth until 1/15/2023            Subjective: Patient reports improvement in her shoulder pain and mobility since beginning physical therapy. She is still having limitations with her functional reach in the shoulder due to both stiffness and pain. Last night she fell asleep on the shoulder, so she has been more achy today. Her pain is located at the top of the shoulder and also at the medial shoulder blade. She will intermittently have pain that extends into the lateral upper arm. She has had improvement with sleep positioning and minimal improvements with reaching out to the side; however,r she is till limited with overhead reaching, lifting/carrying and intermittently with dressing. She would like to continue physical therapy at this time to improve her functional reach and decrease her shoulder pain with ADLS. Pain:4/10  Current functional limitations include carrying bags, loading/unloading the car, reaching above shoulder height, reaching out to the side, maria esther and jacket,   her granddaughter.      Objective: See treatment log  Observation/Posture: Patent stands with right shoulder depression, right scapular adduction, bilateral scapular upward rotation and a forward head  Palpation: Tender at the lateral shoulder and over the biceps tendon   Sensation: No deficits to light touch in the bilateral UE    Cervical Screen: All cervical AROM WFL and pain free     AROM: (* denotes performed with pain)    Shoulder    Flexion: R 158 (150*); L 170  Abduction: R 130 (115*), L170  ER: R 45* (40*); L 73  IR: R L2 (L4*); L T10     Accessory motion: stiff with [address] joint mobs into posterior and inferior glides - range of motion losses in the capsular pattern    Flexibility: decreased flexibility in the pec minor    Strength/MMT: (* denotes performed with pain)  Shoulder Elbow Scapular   Flexion: R 4-*/5; L 4+/5  Abduction: R 3+*/5; L 5/5  ER: R 4-*/5; L 4+/5  IR: R 4*/5; L 5/5 Flexion: R 4+/5; L 5/5  Extension: R 4+/5; L 5/5   Mid trap: R 3*/5; L 3+/5   Low trap: R 2+*/5; L 3+/5     Special tests:   None performed      Assessment:  Patient has made gains in her range of motion in the shoulder into all directions which will improve her functional reach and also her range of motion with ADLS. She has made minimal gains in her strength which will help to improve her shoulder mechanics. She continues to need increased capsular stability and also increased RTC and periscapular strength to improve her scapulohumeral rhythm through additional skilled physical therapy interventions. Goals:   Goals: (to be met in 12 visits)   1. Patient will be independent in a progressive HEP to help manage symptoms and achieve functional independence in 3 sessions. MET  2. Patient will decrease his max pain to 3/10 as needed to improve his functional independence in 3 weeks. WORKING TOWARD   3. Patient will increase her shoulder flexion range to 150 deg or more as needed to reach as needed to grocery shop and put away items PARTIALLY ACHIEVED   4. Patient will increase her shoulder abduction range to 120 deg or more as needed to improve her functional reach for donning a jacket PARTIALLY ACHIEVED  5. Patient will increase her shoulder flexion strength to 4/5 as needed to return to carrying groceries while shopping in the community. WORKING TOWARD   6. Patient will increase her shoulder strength to grossly greater than 4-/5 or greater as needed to load and unload her car. WORKING TOWARD   7. Patient will be independent in sleep positioning modifications to decrease pain at night by 50%. MET    FOTO: to be taken next session    Plan: Continue skilled Physical Therapy 2 x/week or a total of 10 visits over a 90 day period. Treatment will include: manual therapy, range of motion exercises, flexibility exercises, strengthening exercises, postural re-ed, neuromuscular re-education, CKC exercises, joint mobilization, UBE, and HEP instruction all  to improve her functional independence         Patient/Family/Caregiver was advised of these findings, precautions, and treatment options and has agreed to actively participate in planning and for this course of care. Thank you for your referral. If you have any questions, please contact me at Dept: [phone]. Sincerely,  Electronically signed by therapist: Luz Marina Macedo PT     Physician's certification required:  Yes  Please co-sign or sign and return this letter via fax as soon as possible to [phone]. I certify the need for these services furnished under this plan of treatment and while under my care.     X___________________________________________________ Date____________________    Certification From: 10/80/5753  To:2/16/2023     Plan: Continue with range of motion and flexibility to improve her functional mobility and reach with table stretches into all planes    Date: 11/07/2022                 TX#: 7/12 Date: 11/15/2022                 TX#: 8/12 Date: 11/18/2022                 TX#: 9/12   Man      Ther ex  Right GH joint inferior and posterior glides with long axis distraction grade II-III   PROM right shoulder flexion, scaption, abduction and ER  Supine cane flexion 2 x 10   Seated cane ER 2 x 10  Standing cane abduction 2 x 10   Wall slides into flexion x 15  Wall slides into abduction x 10   OSB flexion and scaption AAROM x 15 each   Table ER stretch x 10, 3\"  Pulleys: 1 min each flexion and scaption  Rows 2 x 15 GTB   Standing ER stretch x 10, 3\" Right GH joint inferior and posterior glides with long axis distraction grade II-III   PROM right shoulder flexion, scaption, abduction and ER  Supine cane flexion 2 x 10   Seated cane ER 2 x 10  Standing cane abduction 2 x 10   Wall slides into flexion x 15  Wall slides into abduction x 10   OSB flexion and scaption AAROM x 15 each   Table ER stretch x 10, 3\"  Pulleys: 1 min each flexion and scaption  Rows 2 x 15 GTB   Standing ER stretch x 10, 3\" Right GH joint inferior and posterior glides with long axis distraction grade II-III   PROM right shoulder flexion, scaption, abduction and ER  Supine cane flexion 2 x 10   Seated cane ER 2 x 10  Standing cane abduction 2 x 10   Wall slides into flexion x 15  Wall slides into abduction x 10   OSB flexion and scaption AAROM x 15 each   Table ER stretch x 10, 3\"  Pulleys: 1 min each flexion and scaption  Rows 2 x 15 GTB   Resisted shoulder extension 2 x 10 YTB    NMR Scap retraction x15, 5\"  Doorway pec stretch 3 x 15\"    ER walkout x 10, YTB  IR walkout x 10, YTB   Scap retraction x15, 5\" ND  ER with retraction 2 x 10 YTB  Doorway pec stretch 3 x 15\"    ER walkout x 10, YTB  IR walkout x 10, YTB Scap retraction x15, 5\" ND  ER with retraction 2 x 10 YTB  Doorway pec stretch 3 x 15\"    ER walkout x 10, YTB  IR walkout x 10, YTB   Ther act          HEP: not progressed this visit.     Charges: 2 ther ex, 1 NMR      Total Timed Treatment: 40 min  Total Treatment Time: 40 min

## 2022-11-21 ENCOUNTER — OFFICE VISIT (OUTPATIENT)
Dept: PHYSICAL THERAPY | Age: 72
End: 2022-11-21
Attending: FAMILY MEDICINE
Payer: MEDICARE

## 2022-11-21 PROCEDURE — 97112 NEUROMUSCULAR REEDUCATION: CPT

## 2022-11-21 PROCEDURE — 97110 THERAPEUTIC EXERCISES: CPT

## 2022-11-21 NOTE — PROGRESS NOTES
Diagnosis:   Right rotator cuff tear arthropathy (M75.101, M12.811)   Referring Provider: Milotn Gupta Date of Evaluation:   10/17/2022    Precautions:  None Next MD visit:   none scheduled  Date of Surgery: n/a   Insurance Primary/Secondary: MEDICARE / COMMERCIAL     # Auth Visits: 20 visits, 10 auth until 2/16/2023            Subjective: Patient reports that her shoulder is not feeling too bad and she did not have a lot of pain in the shoulder over the weekend, but she is still painful and limited with reaching to the side or rotating the shoulder     Pain:2/10  Current functional limitations include carrying bags, loading/unloading the car, reaching above shoulder height, reaching out to the side, maria esther and jacket,   her granddaughter. Objective: See treatment log      AROM: (* denotes performed with pain)  11/18/22  Shoulder    Flexion: R 158 (150*); L 170  Abduction: R 130 (115*), L 170  ER: R 45* (40*); L 73  IR: R L2 (L4*); L T 10       Assessment:  Patient had minimal pain in the shoulder wit ther ex; progressed ER stretches as needed to improve her shoulder mobility for improved functional reach. She does need increased end range flexibility and more strength to allow her improved biomechanics with reaching by improving her scapulohumeral rhythm. Goals:   Goals: (to be met in 12 visits)   1. Patient will be independent in a progressive HEP to help manage symptoms and achieve functional independence in 3 sessions. MET  2. Patient will decrease his max pain to 3/10 as needed to improve his functional independence in 3 weeks. WORKING TOWARD   3. Patient will increase her shoulder flexion range to 150 deg or more as needed to reach as needed to grocery shop and put away items PARTIALLY ACHIEVED   4. Patient will increase her shoulder abduction range to 120 deg or more as needed to improve her functional reach for donning a jacket PARTIALLY ACHIEVED  5.  Patient will increase her shoulder flexion strength to 4/5 as needed to return to carrying groceries while shopping in the community. WORKING TOWARD   6. Patient will increase her shoulder strength to grossly greater than 4-/5 or greater as needed to load and unload her car. WORKING TOWARD   7. Patient will be independent in sleep positioning modifications to decrease pain at night by 50%.  MET    FOTO: 56    Plan: Continue with range of motion and flexibility to improve her reach with table stretches into all planes and resisted shoulder adduction as tolerated       Certification From: 78/99/0031  To:2/16/2023        Date: 11/15/2022                 TX#: 8/12 Date: 11/18/2022                 TX#: 9/12 Date: 11/21/2022                 TX#: 10/20   Man      Ther ex  Right GH joint inferior and posterior glides with long axis distraction grade II-III   PROM right shoulder flexion, scaption, abduction and ER  Supine cane flexion 2 x 10   Seated cane ER 2 x 10  Standing cane abduction 2 x 10   Wall slides into flexion x 15  Wall slides into abduction x 10   OSB flexion and scaption AAROM x 15 each   Table ER stretch x 10, 3\"  Pulleys: 1 min each flexion and scaption  Rows 2 x 15 GTB   Standing ER stretch x 10, 3\" Right GH joint inferior and posterior glides with long axis distraction grade II-III   PROM right shoulder flexion, scaption, abduction and ER  Supine cane flexion 2 x 10   Seated cane ER 2 x 10  Standing cane abduction 2 x 10   Wall slides into flexion x 15  Wall slides into abduction x 10   OSB flexion and scaption AAROM x 15 each   Table ER stretch x 10, 3\"  Pulleys: 1 min each flexion and scaption  Rows 2 x 15 GTB   Resisted shoulder extension 2 x 10 YTB  Right GH joint inferior and posterior glides with long axis distraction grade II-III   PROM right shoulder flexion, scaption, abduction and ER  Supine cane flexion 2 x 10   Seated cane ER 2 x 10  Standing cane abduction 2 x 10   Wall slides into flexion x 15  Wall slides into abduction x 10   OSB flexion and scaption AAROM x 20 each     Pulleys: 2 min each flexion and scaption  Rows 2 x 15 GTB   Resisted shoulder extension 2 x 10 YTB   Table ER stretch x 10, 3\"  Doorway ER stretch x 10, 2\"   NMR Scap retraction x15, 5\" ND  ER with retraction 2 x 10 YTB  Doorway pec stretch 3 x 15\"    ER walkout x 10, YTB  IR walkout x 10, YTB Scap retraction x15, 5\" ND  ER with retraction 2 x 10 YTB  Doorway pec stretch 3 x 15\"    ER walkout x 10, YTB  IR walkout x 10, YTB   ER with retraction 2 x 10 YTB  Doorway pec stretch for postural re-ed 3 x 15\"    ER walkout x 10, YTB  IR walkout x 10, YTB   Ther act          HEP: not progressed this visit.     Charges: 2 ther ex, 1 NMR      Total Timed Treatment: 40 min  Total Treatment Time: 40 min

## 2022-11-23 ENCOUNTER — OFFICE VISIT (OUTPATIENT)
Dept: PHYSICAL THERAPY | Age: 72
End: 2022-11-23
Attending: FAMILY MEDICINE
Payer: MEDICARE

## 2022-11-23 PROCEDURE — 97110 THERAPEUTIC EXERCISES: CPT

## 2022-11-23 PROCEDURE — 97112 NEUROMUSCULAR REEDUCATION: CPT

## 2022-11-23 NOTE — PROGRESS NOTES
Diagnosis:   Right rotator cuff tear arthropathy (M75.101, M12.811)   Referring Provider: Destiny Sinclair Date of Evaluation:   10/17/2022    Precautions:  None Next MD visit:   none scheduled  Date of Surgery: n/a   Insurance Primary/Secondary: MEDICARE / COMMERCIAL     # Auth Visits: 20 visits, 10 auth until 2/16/2023            Subjective: Patient reports that her pain is very low and minimal. It has not been bothering her much, she will do more carrying tomorrow with the holiday so that will test her shoulder because of the increased lifting    Pain:1/10  Current functional limitations include carrying bags, loading/unloading the car, reaching above shoulder height, reaching out to the side, maria esther and jacket,   her granddaughter. Objective: See treatment log      AROM: (* denotes performed with pain)  11/18/22  Shoulder    Flexion: R 158 (150*); L 170  Abduction: R 130 (115*), L 170  ER: R 45* (40*); L 73  IR: R L2 (L4*); L T 10       Assessment:  Patient had less pain and discomfort with scaption on the SB this visit. continued with ROM progression. She had some soreness in the lateral upper arm with ER with retraction this session; progressed with resisted shoulder adduction to help improve force generation of the RTC as needed to support the shoulder and decrease impingement as her range of motion progresses. Goals:   Goals: (to be met in 12 visits)   1. Patient will be independent in a progressive HEP to help manage symptoms and achieve functional independence in 3 sessions. MET  2. Patient will decrease his max pain to 3/10 as needed to improve his functional independence in 3 weeks. WORKING TOWARD   3. Patient will increase her shoulder flexion range to 150 deg or more as needed to reach as needed to grocery shop and put away items PARTIALLY ACHIEVED   4.  Patient will increase her shoulder abduction range to 120 deg or more as needed to improve her functional reach for donning a jacket PARTIALLY ACHIEVED  5. Patient will increase her shoulder flexion strength to 4/5 as needed to return to carrying groceries while shopping in the community. WORKING TOWARD   6. Patient will increase her shoulder strength to grossly greater than 4-/5 or greater as needed to load and unload her car. WORKING TOWARD   7. Patient will be independent in sleep positioning modifications to decrease pain at night by 50%.  MET        Plan: Continue with range of motion and flexibility to improve her reach with table stretches and add seated ER with weight next visit       Certification From: 92/40/7619  To:2/16/2023        Date: 11/18/2022                 TX#: 9/12 Date: 11/21/2022                 TX#: 10/20 Date: 11/23/2022                 TX#: 11/20   Man      Ther ex  Right GH joint inferior and posterior glides with long axis distraction grade II-III   PROM right shoulder flexion, scaption, abduction and ER  Supine cane flexion 2 x 10   Seated cane ER 2 x 10  Standing cane abduction 2 x 10   Wall slides into flexion x 15  Wall slides into abduction x 10   OSB flexion and scaption AAROM x 15 each   Table ER stretch x 10, 3\"  Pulleys: 1 min each flexion and scaption  Rows 2 x 15 GTB   Resisted shoulder extension 2 x 10 YTB  Right GH joint inferior and posterior glides with long axis distraction grade II-III   PROM right shoulder flexion, scaption, abduction and ER  Supine cane flexion 2 x 10   Seated cane ER 2 x 10  Standing cane abduction 2 x 10   Wall slides into flexion x 15  Wall slides into abduction x 10   OSB flexion and scaption AAROM x 20 each     Pulleys: 2 min each flexion and scaption  Rows 2 x 15 GTB   Resisted shoulder extension 2 x 10 YTB   Table ER stretch x 10, 3\"  Doorway ER stretch x 10, 2\" Right GH joint inferior and posterior glides with long axis distraction grade II-III   PROM right shoulder flexion, scaption, abduction and ER  Supine cane flexion 2 x 10   Seated cane ER 2 x 10  Standing cane abduction 2 x 10 Wall slides into flexion x 15  Wall slides into abduction x 15   OSB flexion and scaption AAROM x 20 each     Pulleys: 2 min each flexion and scaption  Rows 2 x 15 GTB   Resisted shoulder extension 2 x 10 YTB   Resisted shoulder adduction 2 x 10 YTB  Table ER stretch x 10, 3\"  Doorway ER stretch x 10, 2\"  Seated table flexion stretch x 10, 3\"    NMR Scap retraction x15, 5\" ND  ER with retraction 2 x 10 YTB  Doorway pec stretch 3 x 15\"    ER walkout x 10, YTB  IR walkout x 10, YTB   ER with retraction 2 x 10 YTB  Doorway pec stretch for postural re-ed 3 x 15\"    ER walkout x 10, YTB  IR walkout x 10, YTB   ER with retraction 2 x 10 YTB  Doorway pec stretch for postural re-ed 3 x 15\"    ER walkout x 10, YTB  IR walkout x 10, YTB   Ther act          HEP: HOME EXERCISE PROGRAM [[id number]]  No Money -  Repeat 10 Times, Complete 2 Sets, Perform 1 Times a Day  WALL EXTERNAL ROTATION STRETCH - ER -  Repeat 10 Times, Hold 3 Seconds, Complete 1 Set, Perform 1 Times a Day    Charges: 2 ther ex, 1 NMR      Total Timed Treatment: 40 min  Total Treatment Time: 40 min

## 2022-11-28 ENCOUNTER — OFFICE VISIT (OUTPATIENT)
Dept: PHYSICAL THERAPY | Age: 72
End: 2022-11-28
Attending: FAMILY MEDICINE
Payer: MEDICARE

## 2022-11-28 PROCEDURE — 97112 NEUROMUSCULAR REEDUCATION: CPT

## 2022-11-28 PROCEDURE — 97110 THERAPEUTIC EXERCISES: CPT

## 2022-11-28 NOTE — PROGRESS NOTES
Diagnosis:   Right rotator cuff tear arthropathy (M75.101, M12.811)   Referring Provider: Destiny Sinclair Date of Evaluation:   10/17/2022    Precautions:  None Next MD visit:   none scheduled  Date of Surgery: n/a   Insurance Primary/Secondary: MEDICARE / COMMERCIAL     # Auth Visits: 20 visits, 10 auth until 2/16/2023            Subjective: Patient reports that her shoulder has been more sore at the top of the shoulder since yesterday and then also today so she has been having more pain with reaching. She isn;t sure if she slept funny and overstretched her shoulder     Pain:4/10  Current functional limitations include carrying bags, loading/unloading the car, reaching above shoulder height, reaching out to the side, maria esther and jacket,   her granddaughter. Objective: See treatment log      AROM: (* denotes performed with pain)  11/18/22  Shoulder    Flexion: R 158 (150*); L 170  Abduction: R 130 (115*), L 170  ER: R 45* (40*); L 73  IR: R L2 (L4*); L T 10       Assessment:  Patient had more pain with AAROM exercises this session; however, she did not have any losses with her passive stretches today. Continued with range of motion and gentle strengthening with seated ER to help promote additional range of motion this session; she is moderately limited during ER with retraction de to range of motion and strength deficits which causes pain and limited range with functional reaching. Goals:   Goals: (to be met in 12 visits)   1. Patient will be independent in a progressive HEP to help manage symptoms and achieve functional independence in 3 sessions. MET  2. Patient will decrease his max pain to 3/10 as needed to improve his functional independence in 3 weeks. WORKING TOWARD   3. Patient will increase her shoulder flexion range to 150 deg or more as needed to reach as needed to grocery shop and put away items PARTIALLY ACHIEVED   4.  Patient will increase her shoulder abduction range to 120 deg or more as needed to improve her functional reach for donning a jacket PARTIALLY ACHIEVED  5. Patient will increase her shoulder flexion strength to 4/5 as needed to return to carrying groceries while shopping in the community. WORKING TOWARD   6. Patient will increase her shoulder strength to grossly greater than 4-/5 or greater as needed to load and unload her car. WORKING TOWARD   7. Patient will be independent in sleep positioning modifications to decrease pain at night by 50%.  MET        Plan: Continue with range of motion and flexibility to improve her reach with progression of seated ER next visit        Certification From: 53/03/2955  To:2/16/2023        Date: 11/21/2022                 TX#: 10/20 Date: 11/23/2022                 TX#: 11/20 Date: 11/28/2022                 TX#: 12/20   Man      Ther ex  Right GH joint inferior and posterior glides with long axis distraction grade II-III   PROM right shoulder flexion, scaption, abduction and ER  Supine cane flexion 2 x 10   Seated cane ER 2 x 10  Standing cane abduction 2 x 10   Wall slides into flexion x 15  Wall slides into abduction x 10   OSB flexion and scaption AAROM x 20 each     Pulleys: 2 min each flexion and scaption  Rows 2 x 15 GTB   Resisted shoulder extension 2 x 10 YTB   Table ER stretch x 10, 3\"  Doorway ER stretch x 10, 2\" Right GH joint inferior and posterior glides with long axis distraction grade II-III   PROM right shoulder flexion, scaption, abduction and ER  Supine cane flexion 2 x 10   Seated cane ER 2 x 10  Standing cane abduction 2 x 10   Wall slides into flexion x 15  Wall slides into abduction x 15   OSB flexion and scaption AAROM x 20 each     Pulleys: 2 min each flexion and scaption  Rows 2 x 15 GTB   Resisted shoulder extension 2 x 10 YTB   Resisted shoulder adduction 2 x 10 YTB  Table ER stretch x 10, 3\"  Doorway ER stretch x 10, 2\"  Seated table flexion stretch x 10, 3\"  Right GH joint inferior and posterior glides with long axis distraction grade II-III   PROM right shoulder flexion, scaption, abduction and ER  Supine cane flexion 2 x 10   Seated cane ER 2 x 10  Standing cane abduction 2 x 10   Doorway ER stretch x 10, 3\"  Wall slides into flexion x 15  Wall slides into abduction x 15   OSB flexion and scaption AAROM x 20 each     Pulleys: 2 min each flexion and scaption  Rows 2 x 15 GTB   Resisted shoulder extension 2 x 10 YTB   Resisted shoulder adduction 2 x 10 YTB  Table ER stretch x 10, 3\"  Seated ER x 10, 1#  Seated table flexion stretch x 10, 3\"    NMR   ER with retraction 2 x 10 YTB  Doorway pec stretch for postural re-ed 3 x 15\"    ER walkout x 10, YTB  IR walkout x 10, YTB   ER with retraction 2 x 10 YTB  Doorway pec stretch for postural re-ed 3 x 15\"    ER walkout x 10, YTB  IR walkout x 10, YTB ER with retraction 2 x 10 YTB  Doorway pec stretch for postural re-ed 3 x 15\"    ER walkout x 10, YTB  IR walkout x 10, YTB   Ther act          HEP: reviewed     Charges: 2 ther ex, 1 NMR      Total Timed Treatment: 43 min  Total Treatment Time: 43 min

## 2022-12-02 ENCOUNTER — OFFICE VISIT (OUTPATIENT)
Dept: PHYSICAL THERAPY | Age: 72
End: 2022-12-02
Attending: DENTIST
Payer: MEDICARE

## 2022-12-02 PROCEDURE — 97112 NEUROMUSCULAR REEDUCATION: CPT

## 2022-12-02 PROCEDURE — 97110 THERAPEUTIC EXERCISES: CPT

## 2022-12-02 NOTE — PROGRESS NOTES
Diagnosis:   Right rotator cuff tear arthropathy (M75.101, M12.811)   Referring Provider: Jonnathan Mims Date of Evaluation:   10/17/2022    Precautions:  None Next MD visit:   none scheduled  Date of Surgery: n/a   Insurance Primary/Secondary: MEDICARE / COMMERCIAL     # Auth Visits: 20 visits, 10 auth until 2/16/2023            Subjective: Patient reports that her shoulder is feeling better than it did at the last sessio. She has been using the shoulder and it has been ok with motion and activities     Pain: 2/10  Current functional limitations include carrying bags, loading/unloading the car, reaching above shoulder height, reaching out to the side, maria esther and jacket,   her granddaughter. Objective: See treatment log    12/2/22: AROM ER 51    11/18/22 AROM: (* denotes performed with pain)    Shoulder    Flexion: R 158 (150*); L 170  Abduction: R 130 (115*), L 170  ER: R 45* (40*); L 73  IR: R L2 (L4*); L T 10       Assessment:  Patient had some minimal lateral upper arm pain with ER with retraction this session; however she is demonstrating better range of motion with this resisted exercise. Continued with additional reps with seated ER with the seated ER strength to help promote additional flexibility of the soft tissues into ER as this will improve her functional reach back and to the side. Goals:   Goals: (to be met in 12 visits)   1. Patient will be independent in a progressive HEP to help manage symptoms and achieve functional independence in 3 sessions. MET  2. Patient will decrease his max pain to 3/10 as needed to improve his functional independence in 3 weeks. WORKING TOWARD   3. Patient will increase her shoulder flexion range to 150 deg or more as needed to reach as needed to grocery shop and put away items PARTIALLY ACHIEVED   4. Patient will increase her shoulder abduction range to 120 deg or more as needed to improve her functional reach for donning a jacket PARTIALLY ACHIEVED  5.  Patient will increase her shoulder flexion strength to 4/5 as needed to return to carrying groceries while shopping in the community. WORKING TOWARD   6. Patient will increase her shoulder strength to grossly greater than 4-/5 or greater as needed to load and unload her car. WORKING TOWARD   7. Patient will be independent in sleep positioning modifications to decrease pain at night by 50%.  MET        Plan: Continue with range of motion and flexibility to improve her reach; add wall washes and extension  to promote multidirectional flexibility for reaching        Certification From: 97/08/5906  To:2/16/2023        Date: 11/23/2022                 TX#: 11/20 Date: 11/28/2022                 TX#: 12/20 Date: 12/2/2022                 TX#: 13/20   Man      Ther ex  Right GH joint inferior and posterior glides with long axis distraction grade II-III   PROM right shoulder flexion, scaption, abduction and ER  Supine cane flexion 2 x 10   Seated cane ER 2 x 10  Standing cane abduction 2 x 10   Wall slides into flexion x 15  Wall slides into abduction x 15   OSB flexion and scaption AAROM x 20 each     Pulleys: 2 min each flexion and scaption  Rows 2 x 15 GTB   Resisted shoulder extension 2 x 10 YTB   Resisted shoulder adduction 2 x 10 YTB  Table ER stretch x 10, 3\"  Doorway ER stretch x 10, 2\"  Seated table flexion stretch x 10, 3\"  Right GH joint inferior and posterior glides with long axis distraction grade II-III   PROM right shoulder flexion, scaption, abduction and ER  Supine cane flexion 2 x 10   Seated cane ER 2 x 10  Standing cane abduction 2 x 10   Doorway ER stretch x 10, 3\"  Wall slides into flexion x 15  Wall slides into abduction x 15   OSB flexion and scaption AAROM x 20 each     Pulleys: 2 min each flexion and scaption  Rows 2 x 15 GTB   Resisted shoulder extension 2 x 10 YTB   Resisted shoulder adduction 2 x 10 YTB  Table ER stretch x 10, 3\"  Seated ER x 10, 1#  Seated table flexion stretch x 10, 3\"  Right GH joint inferior and posterior glides with long axis distraction grade II-III   PROM right shoulder flexion,  scaption, abduction and ER  Supine cane flexion 2 x 10   Seated cane ER 2 x 10  Standing cane abduction 2 x 10     Doorway ER stretch x 10, 3\"  Wall slides into flexion x 15  Wall slides into abduction x 15   OSB flexion and scaption AAROM x 20 each     Pulleys: 2 min each flexion and scaption  Rows 2 x 15 GTB   Resisted shoulder extension 2 x 10 YTB   Resisted shoulder adduction 2 x 10 YTB  Table ER stretch x 10, 3\"  Seated ER 2 x 10, 0# (with table stretch for ER   Seated table flexion stretch x 10, 3\"    NMR   ER with retraction 2 x 10 YTB  Doorway pec stretch for postural re-ed 3 x 15\"    ER walkout x 10, YTB  IR walkout x 10, YTB ER with retraction 2 x 10 YTB  Doorway pec stretch for postural re-ed 3 x 15\"    ER walkout x 10, YTB  IR walkout x 10, YTB ER with retraction 2 x 10 YTB  Doorway pec stretch for postural re-ed 3 x 15\"    ER walkout x 10, YTB  IR walkout x 10, YTB   Ther act          HEP: reviewed     Charges: 2 ther ex, 1 NMR      Total Timed Treatment: 42 min  Total Treatment Time: 42 min

## 2022-12-05 ENCOUNTER — OFFICE VISIT (OUTPATIENT)
Dept: PHYSICAL THERAPY | Age: 72
End: 2022-12-05
Attending: DENTIST
Payer: MEDICARE

## 2022-12-05 PROCEDURE — 97112 NEUROMUSCULAR REEDUCATION: CPT

## 2022-12-05 PROCEDURE — 97110 THERAPEUTIC EXERCISES: CPT

## 2022-12-05 NOTE — PROGRESS NOTES
Diagnosis:   Right rotator cuff tear arthropathy (M75.101, M12.811)   Referring Provider: Aleena Celeste Date of Evaluation:   10/17/2022    Precautions:  None Next MD visit:   none scheduled  Date of Surgery: n/a   Insurance Primary/Secondary: MEDICARE / COMMERCIAL     # Auth Visits: 20 visits, 10 auth until 2/16/2023            Subjective: Patient reports that her shoulder is feeling pretty good today and it did not bother her too much over the weekend. She is able to carry her heavy purse in her right arm so that is an improvement. She is still feeling limited with her motion into ER. Pain: 2/10  Current functional limitations include carrying bags, loading/unloading the car, reaching above shoulder height, reaching out to the side, maria esther and jacket,   her granddaughter. Objective: See treatment log    12/2/22: AROM ER 51    11/18/22 AROM: (* denotes performed with pain)    Shoulder    Flexion: R 158 (150*); L 170  Abduction: R 130 (115*), L 170  ER: R 45* (40*); L 73  IR: R L2 (L4*); L T 10       Assessment:  Patient had no increased pain with ther ex this session; added stretches into shoulder IR this session to help decrease stiffness into shoulder ER. She has more stiffness into ER vs pain as she did at the onset of physical therapy. She had better performance of seated ER this session as her capsular mobility and strength are improving; needs reinforcement. Goals:   Goals: (to be met in 12 visits)   1. Patient will be independent in a progressive HEP to help manage symptoms and achieve functional independence in 3 sessions. MET  2. Patient will decrease his max pain to 3/10 as needed to improve his functional independence in 3 weeks. WORKING TOWARD   3. Patient will increase her shoulder flexion range to 150 deg or more as needed to reach as needed to grocery shop and put away items PARTIALLY ACHIEVED   4.  Patient will increase her shoulder abduction range to 120 deg or more as needed to improve her functional reach for donning a jacket PARTIALLY ACHIEVED  5. Patient will increase her shoulder flexion strength to 4/5 as needed to return to carrying groceries while shopping in the community. WORKING TOWARD   6. Patient will increase her shoulder strength to grossly greater than 4-/5 or greater as needed to load and unload her car. WORKING TOWARD   7. Patient will be independent in sleep positioning modifications to decrease pain at night by 50%. MET        Plan: Continue with range of motion and flexibility to improve her reach; add T bar extension, sleeper stretch, and resisted ER/IR in standing to improve mechanics for reaching.         Certification From: 80/64/5601  To:2/16/2023        Date: 11/28/2022                 TX#: 12/20 Date: 12/2/2022                 TX#: 13/20 Date: 12/5/2022                 TX#: 14/20   Man      Ther ex  Right GH joint inferior and posterior glides with long axis distraction grade II-III   PROM right shoulder flexion, scaption, abduction and ER  Supine cane flexion 2 x 10   Seated cane ER 2 x 10  Standing cane abduction 2 x 10   Doorway ER stretch x 10, 3\"  Wall slides into flexion x 15  Wall slides into abduction x 15   OSB flexion and scaption AAROM x 20 each     Pulleys: 2 min each flexion and scaption  Rows 2 x 15 GTB   Resisted shoulder extension 2 x 10 YTB   Resisted shoulder adduction 2 x 10 YTB  Table ER stretch x 10, 3\"  Seated ER x 10, 1#  Seated table flexion stretch x 10, 3\"  Right GH joint inferior and posterior glides with long axis distraction grade II-III   PROM right shoulder flexion,  scaption, abduction and ER  Supine cane flexion 2 x 10   Seated cane ER 2 x 10  Standing cane abduction 2 x 10     Doorway ER stretch x 10, 3\"  Wall slides into flexion x 15  Wall slides into abduction x 15   OSB flexion and scaption AAROM x 20 each     Pulleys: 2 min each flexion and scaption  Rows 2 x 15 GTB   Resisted shoulder extension 2 x 10 YTB   Resisted shoulder adduction 2 x 10 YTB  Table ER stretch x 10, 3\"  Seated ER 2 x 10, 0# (with table stretch for ER   Seated table flexion stretch x 10, 3\"  Right GH joint inferior and posterior glides with long axis distraction grade II-III   PROM right shoulder flexion,  scaption, abduction and ER and IR    Supine cane flexion 2 x 10   Seated cane ER 2 x 10  Standing cane abduction 2 x 10     Doorway ER stretch x 10, 3\"  Wall slides into flexion x 15  Wall slides into abduction x 15  Wall washes x 10 CW and CCW    OSB flexion and scaption AAROM x 20 each     Pulleys: 2 min each flexion and scaption  Rows 2 x 15 GTB   Resisted shoulder extension 2 x 10 YTB   Resisted shoulder adduction 2 x 10 YTB  Table ER stretch x 10, 3\"  Seated ER 2 x 10, 0# (with table stretch for ER   Seated table flexion stretch x 10, 3\"    NMR ER with retraction 2 x 10 YTB  Doorway pec stretch for postural re-ed 3 x 15\"    ER walkout x 10, YTB  IR walkout x 10, YTB ER with retraction 2 x 10 YTB  Doorway pec stretch for postural re-ed 3 x 15\"    ER walkout x 10, YTB  IR walkout x 10, YTB ER with retraction 2 x 10 YTB  Doorway pec stretch for postural re-ed 3 x 15\"    ER walkout x 10, YTB  IR walkout x 10, YTB   Ther act          HEP: seated ER    Charges: 2 ther ex, 1 NMR      Total Timed Treatment: 44 min  Total Treatment Time: 44 min

## 2022-12-09 ENCOUNTER — OFFICE VISIT (OUTPATIENT)
Dept: PHYSICAL THERAPY | Age: 72
End: 2022-12-09
Attending: DENTIST
Payer: MEDICARE

## 2022-12-09 PROCEDURE — 97110 THERAPEUTIC EXERCISES: CPT

## 2022-12-09 PROCEDURE — 97112 NEUROMUSCULAR REEDUCATION: CPT

## 2022-12-09 NOTE — PROGRESS NOTES
Diagnosis:   Right rotator cuff tear arthropathy (M75.101, M12.811)   Referring Provider: Jeane Camilo Date of Evaluation:   10/17/2022    Precautions:  None Next MD visit:   none scheduled  Date of Surgery: n/a   Insurance Primary/Secondary: MEDICARE / COMMERCIAL     # Auth Visits: 20 visits, 10 auth until 2/16/2023            Subjective: Patient reports that her shoulder is feeling pretty good and she is having only mild and no pain above a 2/10 all week     Pain: 0-1/10  Current functional limitations include carrying bags, loading/unloading the car, reaching above shoulder height, reaching out to the side, maria esther and jacket,   her granddaughter. Objective: See treatment log    12/2/22: AROM ER 51    11/18/22 AROM: (* denotes performed with pain)    Shoulder    Flexion: R 158 (150*); L 170  Abduction: R 130 (115*), L 170  ER: R 45* (40*); L 73  IR: R L2 (L4*); L T 10       Assessment:  Patient continues to have a firm and capsular end feel limiting her end range ER; however,r she has made signficant gains in her flexion, scaption and abduction ranges. Continued with progression of resisted ER and IR to help improve her RTC strength and therefore shoulder support to improve he functional reach as her capsular mobility improves; needs reinforcement     Goals:   Goals: (to be met in 12 visits)   1. Patient will be independent in a progressive HEP to help manage symptoms and achieve functional independence in 3 sessions. MET  2. Patient will decrease his max pain to 3/10 as needed to improve his functional independence in 3 weeks. WORKING TOWARD   3. Patient will increase her shoulder flexion range to 150 deg or more as needed to reach as needed to grocery shop and put away items PARTIALLY ACHIEVED   4. Patient will increase her shoulder abduction range to 120 deg or more as needed to improve her functional reach for donning a jacket PARTIALLY ACHIEVED  5.  Patient will increase her shoulder flexion strength to 4/5 as needed to return to carrying groceries while shopping in the community. WORKING TOWARD   6. Patient will increase her shoulder strength to grossly greater than 4-/5 or greater as needed to load and unload her car. WORKING TOWARD   7. Patient will be independent in sleep positioning modifications to decrease pain at night by 50%. MET        Plan: Continue for 2 more sessions with range of motion and flexibility to improve her reach; add sleeper stretching standing to improve mechanics for reaching.         Certification From: 07/83/0666  To:2/16/2023        Date: 11/28/2022                 TX#: 12/20 Date: 12/2/2022                 TX#: 13/20 Date: 12/5/2022                 TX#: 14/20 Date: 12/9/2022                 TX#: 15/20   Man       Ther ex  Right GH joint inferior and posterior glides with long axis distraction grade II-III   PROM right shoulder flexion, scaption, abduction and ER  Supine cane flexion 2 x 10   Seated cane ER 2 x 10  Standing cane abduction 2 x 10   Doorway ER stretch x 10, 3\"  Wall slides into flexion x 15  Wall slides into abduction x 15   OSB flexion and scaption AAROM x 20 each     Pulleys: 2 min each flexion and scaption  Rows 2 x 15 GTB   Resisted shoulder extension 2 x 10 YTB   Resisted shoulder adduction 2 x 10 YTB  Table ER stretch x 10, 3\"  Seated ER x 10, 1#  Seated table flexion stretch x 10, 3\"  Right GH joint inferior and posterior glides with long axis distraction grade II-III   PROM right shoulder flexion,  scaption, abduction and ER  Supine cane flexion 2 x 10   Seated cane ER 2 x 10  Standing cane abduction 2 x 10     Doorway ER stretch x 10, 3\"  Wall slides into flexion x 15  Wall slides into abduction x 15   OSB flexion and scaption AAROM x 20 each     Pulleys: 2 min each flexion and scaption  Rows 2 x 15 GTB   Resisted shoulder extension 2 x 10 YTB   Resisted shoulder adduction 2 x 10 YTB  Table ER stretch x 10, 3\"  Seated ER 2 x 10, 0# (with table stretch for ER Seated table flexion stretch x 10, 3\"  Right GH joint inferior and posterior glides with long axis distraction grade II-III   PROM right shoulder flexion,  scaption, abduction and ER and IR    Supine cane flexion 2 x 10   Seated cane ER 2 x 10  Standing cane abduction 2 x 10     Doorway ER stretch x 10, 3\"  Wall slides into flexion x 15  Wall slides into abduction x 15  Wall washes x 10 CW and CCW    OSB flexion and scaption AAROM x 20 each     Pulleys: 2 min each flexion and scaption  Rows 2 x 10 GTB   Resisted shoulder extension 2 x 10 YTB   Resisted shoulder adduction 2 x 10 YTB  Table ER stretch x 10, 3\"  Seated ER 2 x 10, 0# (with table stretch for ER   Seated table flexion stretch x 10, 3\"  Right GH joint inferior and posterior glides with long axis distraction grade II-III   PROM right shoulder flexion,  scaption, abduction and ER and IR    Supine cane flexion 2 x 10   Seated cane ER 2 x 10  Standing cane abduction 2 x 10     Standing sleeper stretch NV  Doorway ER stretch x 10, 3\"  Wall slides into flexion x 15  Wall slides into abduction x 15  Wall washes x 10 CW and CCW    OSB flexion and scaption AAROM x 20 each     Pulleys: 2 min each flexion and scaption  Rows 2 x 10 GTB   Resisted shoulder extension 2 x 10 YTB   Resisted shoulder adduction 2 x 10 YTB  Table ER stretch x 10, 3\"  Seated ER 2 x 10, 0# (with table stretch for ER   Seated table flexion stretch x 10, 3\"  ND   NMR ER with retraction 2 x 10 YTB  Doorway pec stretch for postural re-ed 3 x 15\"    ER walkout x 10, YTB  IR walkout x 10, YTB ER with retraction 2 x 10 YTB  Doorway pec stretch for postural re-ed 3 x 15\"    ER walkout x 10, YTB  IR walkout x 10, YTB ER with retraction 2 x 10 YTB  Doorway pec stretch for postural re-ed 3 x 15\"    ER walkout x 10, YTB  IR walkout x 10, YTB ER with retraction 2 x 10 YTB  Doorway pec stretch for postural re-ed 3 x 15\"    ER resisted x 10, YTB  IR resisted x 10, YTB   Ther act           HEP: not progressed this session    Charges: 2 ther ex, 1 NMR      Total Timed Treatment: 43 min  Total Treatment Time: 44 min

## 2022-12-12 ENCOUNTER — OFFICE VISIT (OUTPATIENT)
Dept: PHYSICAL THERAPY | Age: 72
End: 2022-12-12
Attending: DENTIST
Payer: MEDICARE

## 2022-12-12 PROCEDURE — 97112 NEUROMUSCULAR REEDUCATION: CPT

## 2022-12-12 PROCEDURE — 97110 THERAPEUTIC EXERCISES: CPT

## 2022-12-12 NOTE — PROGRESS NOTES
Diagnosis:   Right rotator cuff tear arthropathy (M75.101, M12.811)   Referring Provider: Reymundo Johns Date of Evaluation:   10/17/2022    Precautions:  None Next MD visit:   none scheduled  Date of Surgery: n/a   Insurance Primary/Secondary: MEDICARE / COMMERCIAL     # Auth Visits: 20 visits, 10 auth until 2/16/2023            Subjective: Patient reports that her shoulder has not really been bothering her as much and she has been doing her HEP as well. She is not having pain with loading and un loading the car at this time and less limitations with donning a jacket. Pain: 0-1/10  Current functional limitations include carrying bags, loading/unloading the car, reaching above shoulder height, reaching out to the side, maria esther and jacket,   her granddaughter. Objective: See treatment log    12/12/22: AROM ER 53    11/18/22 AROM: (* denotes performed with pain)    Shoulder    Flexion: R 158 (150*); L 170  Abduction: R 130 (115*), L 170  ER: R 47* (45*); L 73  IR: R L2 (L4*); L T 10       Assessment:  Patient did have minimally increased stiffness in the shoulder with passive stretches into all planes; however, she did not have more pain with passive stretching. The patient has better form with seated shoulder ER following seated ER stretch by compensating less with elbow flexion. Need to continue with progressive range of motion and strengthening as needed to improve her functional reach. Goals:   Goals: (to be met in 12 visits)   1. Patient will be independent in a progressive HEP to help manage symptoms and achieve functional independence in 3 sessions. MET  2. Patient will decrease his max pain to 3/10 as needed to improve his functional independence in 3 weeks. WORKING TOWARD   3. Patient will increase her shoulder flexion range to 150 deg or more as needed to reach as needed to grocery shop and put away items PARTIALLY ACHIEVED   4.  Patient will increase her shoulder abduction range to 120 deg or more as needed to improve her functional reach for donning a jacket PARTIALLY ACHIEVED  5. Patient will increase her shoulder flexion strength to 4/5 as needed to return to carrying groceries while shopping in the community. WORKING TOWARD   6. Patient will increase her shoulder strength to grossly greater than 4-/5 or greater as needed to load and unload her car. WORKING TOWARD   7. Patient will be independent in sleep positioning modifications to decrease pain at night by 50%.  MET        Plan: Continue for 2 more sessions with range of motion and flexibility to improve her reach; add sleeper stretching standing and progress to a HEP after the next 1-2 sessions        Certification From: 47/23/4299  To:2/16/2023        Date: 12/2/2022                 TX#: 13/20 Date: 12/5/2022                 TX#: 14/20 Date: 12/9/2022                 TX#: 15/20 Date: 12/12/2022                 TX#: 16/20   Man       Ther ex  Right GH joint inferior and posterior glides with long axis distraction grade II-III   PROM right shoulder flexion,  scaption, abduction and ER  Supine cane flexion 2 x 10   Seated cane ER 2 x 10  Standing cane abduction 2 x 10     Doorway ER stretch x 10, 3\"  Wall slides into flexion x 15  Wall slides into abduction x 15   OSB flexion and scaption AAROM x 20 each     Pulleys: 2 min each flexion and scaption  Rows 2 x 15 GTB   Resisted shoulder extension 2 x 10 YTB   Resisted shoulder adduction 2 x 10 YTB  Table ER stretch x 10, 3\"  Seated ER 2 x 10, 0# (with table stretch for ER   Seated table flexion stretch x 10, 3\"  Right GH joint inferior and posterior glides with long axis distraction grade II-III   PROM right shoulder flexion,  scaption, abduction and ER and IR    Supine cane flexion 2 x 10   Seated cane ER 2 x 10  Standing cane abduction 2 x 10     Doorway ER stretch x 10, 3\"  Wall slides into flexion x 15  Wall slides into abduction x 15  Wall washes x 10 CW and CCW    OSB flexion and scaption AAROM x 20 each     Pulleys: 2 min each flexion and scaption  Rows 2 x 10 GTB   Resisted shoulder extension 2 x 10 YTB   Resisted shoulder adduction 2 x 10 YTB  Table ER stretch x 10, 3\"  Seated ER 2 x 10, 0# (with table stretch for ER   Seated table flexion stretch x 10, 3\"  Right GH joint inferior and posterior glides with long axis distraction grade II-III   PROM right shoulder flexion,  scaption, abduction and ER and IR    Supine cane flexion 2 x 10   Seated cane ER 2 x 10  Standing cane abduction 2 x 10     Standing sleeper stretch NV  Doorway ER stretch x 10, 3\"  Wall slides into flexion x 15  Wall slides into abduction x 15  Wall washes x 10 CW and CCW    OSB flexion and scaption AAROM x 20 each     Pulleys: 2 min each flexion and scaption  Rows 2 x 10 GTB   Resisted shoulder extension 2 x 10 YTB   Resisted shoulder adduction 2 x 10 YTB  Table ER stretch x 10, 3\"  Seated ER 2 x 10, 0# (with table stretch for ER   Seated table flexion stretch x 10, 3\"  ND Right GH joint inferior and posterior glides with long axis distraction grade II-III   PROM right shoulder flexion,  scaption, abduction and ER and IR    Supine cane flexion 2 x 10   Seated cane ER 2 x 10  Standing cane abduction 2 x 10     Standing sleeper stretch NV  Doorway ER stretch x 10, 3\"  Wall slides into flexion x 15  Wall slides into abduction x 15  Wall washes x 10 CW and CCW    OSB flexion and scaption AAROM x 20 each     Pulleys: 2 min each flexion and scaption  Rows 2 x 10 GTB   Resisted shoulder extension 2 x 10 YTB   Resisted shoulder adduction 2 x 10 YTB  Table ER stretch x 10, 3\"  Seated ER 2 x 10, 0# (with table stretch for ER      NMR ER with retraction 2 x 10 YTB  Doorway pec stretch for postural re-ed 3 x 15\"    ER walkout x 10, YTB  IR walkout x 10, YTB ER with retraction 2 x 10 YTB  Doorway pec stretch for postural re-ed 3 x 15\"    ER walkout x 10, YTB  IR walkout x 10, YTB ER with retraction 2 x 10 YTB  Doorway pec stretch for postural re-ed 3 x 15\"    ER resisted x 10, YTB  IR resisted x 10, YTB ER with retraction 2 x 10 YTB  Doorway pec stretch for postural re-ed 3 x 15\"    ER resisted x 10, YTB  IR resisted x 10, YTB   Ther act           HEP: not progressed this session    Charges: 2 ther ex, 1 NMR      Total Timed Treatment: 43 min  Total Treatment Time: 43 min

## 2022-12-16 ENCOUNTER — OFFICE VISIT (OUTPATIENT)
Dept: PHYSICAL THERAPY | Age: 72
End: 2022-12-16
Attending: DENTIST
Payer: MEDICARE

## 2022-12-16 PROCEDURE — 97110 THERAPEUTIC EXERCISES: CPT

## 2022-12-16 PROCEDURE — 97112 NEUROMUSCULAR REEDUCATION: CPT

## 2022-12-16 NOTE — PROGRESS NOTES
Danny  Pt has attended 17 visits in Physical Therapy. Diagnosis:   Right rotator cuff tear arthropathy (M75.101, M12.811)   Referring Provider: Milton Gupta Date of Evaluation:   10/17/2022    Precautions:  None Next MD visit:   none scheduled  Date of Surgery: n/a   Insurance Primary/Secondary: MEDICARE / COMMERCIAL     # Auth Visits: 20 visits, 10 auth until 2/16/2023            Subjective: Patient reports that her shoulder has been feeling pretty good and her pain is less than it was. She is having less pain with loading and unloading her car, carrying a bag while shopping, donning a jacket, lifting her granddaughter and reaching. She is able to reach at shoulder height is fine, but over head it still painful and difficult. She continues to locate her pain when she has it at top of her shoulder. She is having many more pain free periods than she had previously.     Pain: 0-3/10  Current functional limitations include  reaching overhead, lifting overhead and reaching out to the side  Objective:   Observation/Posture: Patent stands with right shoulder depression, right scapular adduction, bilateral scapular upward rotation and a forward head  Palpation: Tender at the over the biceps tendon   Sensation: No deficits to light touch in the bilateral UE    Cervical Screen: All cervical AROM WFL and pain free     AROM: (* denotes performed with pain)    Shoulder    Flexion: R 165 (158*); L 170  Abduction: R 140 (130*), L170  ER: R 57* (45*); L 73  IR: R L1 (L2*); L T10     Accessory motion: stiff with GH joint mobs into posterior and inferior glides - range of motion losses in the capsular pattern    Flexibility: decreased flexibility in the pec minor    Strength/MMT: (* denotes performed with pain)  Shoulder Elbow Scapular   Flexion: R 4/5; L 4+/5  Abduction: R 4*/5; L 5/5  ER: R 4*/5; L 4+/5  IR: R 4+/5; L 5/5 Flexion: R 5/5; L 5/5  Extension: R 4+/5; L 5/5   Mid trap: R 3*/5; L 3+/5   Low trap: R Stroke code de-escalated by Dr. Barros. Leydi checks Q1hr.   3*/5; L 3+/5     Special tests:   None performed      Assessment:  Patient has made gains in her capsular mobility and strength as needed to improve her functional reach and [address] joint support during ADLS. She has increased her capsular mobility as needed to improve her GH joint mechanics and increase her available range of motion. She would benefit from additional end range mobility and strength to improve her scapulohumeral rhythm and decrease her pain with all reaching and lifting. She is independent in a progressive HEP and is appropriate to continue on her own as she has minimal functional deficits at this time    Goals:   Goals: (to be met in 12 visits)   1. Patient will be independent in a progressive HEP to help manage symptoms and achieve functional independence in 3 sessions. MET  2. Patient will decrease his max pain to 3/10 as needed to improve his functional independence in 3 weeks. MET  3. Patient will increase her shoulder flexion range to 150 deg or more as needed to reach as needed to grocery shop and put away items PARTIALLY ACHIEVED   4. Patient will increase her shoulder abduction range to 120 deg or more as needed to improve her functional reach for donning a jacket MET  5. Patient will increase her shoulder flexion strength to 4/5 as needed to return to carrying groceries while shopping in the community. MET  6. Patient will increase her shoulder strength to grossly greater than 4-/5 or greater as needed to load and unload her car. MET  7. Patient will be independent in sleep positioning modifications to decrease pain at night by 50%. MET    FOTO: 62    Plan: DC with HEP. Patient was instructed to follow up with their physician should an exacerbation of symptoms arise. Patient/Family/Caregiver was advised of these findings, precautions, and treatment options and has agreed to actively participate in planning and for this course of care.     Thank you for your referral. If you have any questions, please contact me at Dept: [phone]. Sincerely,  Electronically signed by therapist: Gloria Ventura PT     Physician's certification required:  No  Please co-sign or sign and return this letter via fax as soon as possible to [phone]. I certify the need for these services furnished under this plan of treatment and while under my care.     X___________________________________________________ Date____________________    Certification From: 89/67/2212  To:3/16/2023        Date: 12/5/2022                 TX#: 14/20 Date: 12/9/2022                 TX#: 15/20 Date: 12/12/2022                 TX#: 16/20 Date: 12/16/2022                 TX#: 17/20   Man       Ther ex  Right GH joint inferior and posterior glides with long axis distraction grade II-III   PROM right shoulder flexion,  scaption, abduction and ER and IR    Supine cane flexion 2 x 10   Seated cane ER 2 x 10  Standing cane abduction 2 x 10     Doorway ER stretch x 10, 3\"  Wall slides into flexion x 15  Wall slides into abduction x 15  Wall washes x 10 CW and CCW    OSB flexion and scaption AAROM x 20 each     Pulleys: 2 min each flexion and scaption  Rows 2 x 10 GTB   Resisted shoulder extension 2 x 10 YTB   Resisted shoulder adduction 2 x 10 YTB  Table ER stretch x 10, 3\"  Seated ER 2 x 10, 0# (with table stretch for ER   Seated table flexion stretch x 10, 3\"  Right GH joint inferior and posterior glides with long axis distraction grade II-III   PROM right shoulder flexion,  scaption, abduction and ER and IR    Supine cane flexion 2 x 10   Seated cane ER 2 x 10  Standing cane abduction 2 x 10     Standing sleeper stretch NV  Doorway ER stretch x 10, 3\"  Wall slides into flexion x 15  Wall slides into abduction x 15  Wall washes x 10 CW and CCW    OSB flexion and scaption AAROM x 20 each     Pulleys: 2 min each flexion and scaption  Rows 2 x 10 GTB   Resisted shoulder extension 2 x 10 YTB   Resisted shoulder adduction 2 x 10 YTB  Table ER stretch x 10, 3\"  Seated ER 2 x 10, 0# (with table stretch for ER   Seated table flexion stretch x 10, 3\"  ND Right GH joint inferior and posterior glides with long axis distraction grade II-III   PROM right shoulder flexion,  scaption, abduction and ER and IR    Supine cane flexion 2 x 10   Seated cane ER 2 x 10  Standing cane abduction 2 x 10     Standing sleeper stretch NV  Doorway ER stretch x 10, 3\"  Wall slides into flexion x 15  Wall slides into abduction x 15  Wall washes x 10 CW and CCW    OSB flexion and scaption AAROM x 20 each     Pulleys: 2 min each flexion and scaption  Rows 2 x 10 GTB   Resisted shoulder extension 2 x 10 YTB   Resisted shoulder adduction 2 x 10 YTB  Table ER stretch x 10, 3\"  Seated ER 2 x 10, 0# (with table stretch for ER    Right GH joint inferior and posterior glides with long axis distraction grade II-III   PROM right shoulder flexion,  scaption, abduction and ER and IR    Supine cane flexion 2 x 10   Seated cane ER 2 x 10  Standing cane abduction 2 x 10     Standing sleeper stretch NV  Doorway ER stretch x 10, 3\"  Wall slides into flexion x 15  Wall slides into abduction x 15  Wall washes x 10 CW and CCW    OSB flexion and scaption AAROM x 20 each     Pulleys: 2 min each flexion and scaption  Rows 2 x 10 GTB   Resisted shoulder extension 2 x 10 YTB   Resisted shoulder adduction 2 x 10 YTB  Table ER stretch x 10, 3\"  Seated ER 2 x 10, 0# (with table stretch for ER   Sleeper stretch 3 x 10\" in standing    NMR ER with retraction 2 x 10 YTB  Doorway pec stretch for postural re-ed 3 x 15\"    ER walkout x 10, YTB  IR walkout x 10, YTB ER with retraction 2 x 10 YTB  Doorway pec stretch for postural re-ed 3 x 15\"    ER resisted x 10, YTB  IR resisted x 10, YTB ER with retraction 2 x 10 YTB  Doorway pec stretch for postural re-ed 3 x 15\"    ER resisted x 10, YTB  IR resisted x 10, YTB ER with retraction 2 x 10 YTB  Doorway pec stretch for postural re-ed 3 x 15\"    ER resisted x 10, YTB  IR resisted x 10, YTB   Ther act           HEP: sleeper stretch in standing and reviewed final HEP    Charges: 2 ther ex, 1 NMR      Total Timed Treatment: 44 min  Total Treatment Time: 46 min

## 2023-01-16 ENCOUNTER — LAB ENCOUNTER (OUTPATIENT)
Dept: LAB | Age: 73
End: 2023-01-16
Attending: FAMILY MEDICINE
Payer: MEDICARE

## 2023-01-16 DIAGNOSIS — E11.65 TYPE 2 DIABETES MELLITUS WITH HYPERGLYCEMIA, WITHOUT LONG-TERM CURRENT USE OF INSULIN (HCC): ICD-10-CM

## 2023-01-16 DIAGNOSIS — E78.5 HYPERLIPIDEMIA, UNSPECIFIED HYPERLIPIDEMIA TYPE: ICD-10-CM

## 2023-01-16 LAB
ALBUMIN SERPL-MCNC: 3.9 G/DL (ref 3.4–5)
ALBUMIN/GLOB SERPL: 1 {RATIO} (ref 1–2)
ALP LIVER SERPL-CCNC: 103 U/L
ALT SERPL-CCNC: 27 U/L
ANION GAP SERPL CALC-SCNC: 4 MMOL/L (ref 0–18)
AST SERPL-CCNC: 17 U/L (ref 15–37)
BASOPHILS # BLD AUTO: 0.05 X10(3) UL (ref 0–0.2)
BASOPHILS NFR BLD AUTO: 0.7 %
BILIRUB SERPL-MCNC: 0.5 MG/DL (ref 0.1–2)
BUN BLD-MCNC: 15 MG/DL (ref 7–18)
BUN/CREAT SERPL: 17 (ref 10–20)
CALCIUM BLD-MCNC: 9.5 MG/DL (ref 8.5–10.1)
CHLORIDE SERPL-SCNC: 106 MMOL/L (ref 98–112)
CHOLEST SERPL-MCNC: 272 MG/DL (ref ?–200)
CO2 SERPL-SCNC: 28 MMOL/L (ref 21–32)
CREAT BLD-MCNC: 0.88 MG/DL
DEPRECATED RDW RBC AUTO: 44.3 FL (ref 35.1–46.3)
EOSINOPHIL # BLD AUTO: 0.18 X10(3) UL (ref 0–0.7)
EOSINOPHIL NFR BLD AUTO: 2.7 %
ERYTHROCYTE [DISTWIDTH] IN BLOOD BY AUTOMATED COUNT: 12.6 % (ref 11–15)
EST. AVERAGE GLUCOSE BLD GHB EST-MCNC: 134 MG/DL (ref 68–126)
FASTING PATIENT LIPID ANSWER: YES
FASTING STATUS PATIENT QL REPORTED: YES
GFR SERPLBLD BASED ON 1.73 SQ M-ARVRAT: 70 ML/MIN/1.73M2 (ref 60–?)
GLOBULIN PLAS-MCNC: 4 G/DL (ref 2.8–4.4)
GLUCOSE BLD-MCNC: 120 MG/DL (ref 70–99)
HBA1C MFR BLD: 6.3 % (ref ?–5.7)
HCT VFR BLD AUTO: 42.2 %
HDLC SERPL-MCNC: 51 MG/DL (ref 40–59)
HGB BLD-MCNC: 13.5 G/DL
IMM GRANULOCYTES # BLD AUTO: 0.02 X10(3) UL (ref 0–1)
IMM GRANULOCYTES NFR BLD: 0.3 %
LDLC SERPL CALC-MCNC: 195 MG/DL (ref ?–100)
LYMPHOCYTES # BLD AUTO: 1.63 X10(3) UL (ref 1–4)
LYMPHOCYTES NFR BLD AUTO: 24.4 %
MCH RBC QN AUTO: 30.4 PG (ref 26–34)
MCHC RBC AUTO-ENTMCNC: 32 G/DL (ref 31–37)
MCV RBC AUTO: 95 FL
MONOCYTES # BLD AUTO: 0.57 X10(3) UL (ref 0.1–1)
MONOCYTES NFR BLD AUTO: 8.5 %
NEUTROPHILS # BLD AUTO: 4.24 X10 (3) UL (ref 1.5–7.7)
NEUTROPHILS # BLD AUTO: 4.24 X10(3) UL (ref 1.5–7.7)
NEUTROPHILS NFR BLD AUTO: 63.4 %
NONHDLC SERPL-MCNC: 221 MG/DL (ref ?–130)
OSMOLALITY SERPL CALC.SUM OF ELEC: 288 MOSM/KG (ref 275–295)
PLATELET # BLD AUTO: 342 10(3)UL (ref 150–450)
POTASSIUM SERPL-SCNC: 4.1 MMOL/L (ref 3.5–5.1)
PROT SERPL-MCNC: 7.9 G/DL (ref 6.4–8.2)
RBC # BLD AUTO: 4.44 X10(6)UL
SODIUM SERPL-SCNC: 138 MMOL/L (ref 136–145)
TRIGL SERPL-MCNC: 140 MG/DL (ref 30–149)
VLDLC SERPL CALC-MCNC: 30 MG/DL (ref 0–30)
WBC # BLD AUTO: 6.7 X10(3) UL (ref 4–11)

## 2023-01-16 PROCEDURE — 80061 LIPID PANEL: CPT

## 2023-01-16 PROCEDURE — 80053 COMPREHEN METABOLIC PANEL: CPT

## 2023-01-16 PROCEDURE — 85025 COMPLETE CBC W/AUTO DIFF WBC: CPT

## 2023-01-16 PROCEDURE — 82985 ASSAY OF GLYCATED PROTEIN: CPT

## 2023-01-16 PROCEDURE — 83036 HEMOGLOBIN GLYCOSYLATED A1C: CPT

## 2023-01-16 PROCEDURE — 36415 COLL VENOUS BLD VENIPUNCTURE: CPT

## 2023-01-17 LAB — FRUCTOSAMINE: 264 UMOL/L

## 2023-01-24 ENCOUNTER — OFFICE VISIT (OUTPATIENT)
Dept: INTEGRATIVE MEDICINE | Facility: CLINIC | Age: 73
End: 2023-01-24
Payer: MEDICARE

## 2023-01-24 VITALS
HEART RATE: 70 BPM | BODY MASS INDEX: 34 KG/M2 | WEIGHT: 176 LBS | DIASTOLIC BLOOD PRESSURE: 82 MMHG | SYSTOLIC BLOOD PRESSURE: 122 MMHG | OXYGEN SATURATION: 98 %

## 2023-01-24 DIAGNOSIS — I16.0 HYPERTENSIVE URGENCY: ICD-10-CM

## 2023-01-24 DIAGNOSIS — E11.65 TYPE 2 DIABETES MELLITUS WITH HYPERGLYCEMIA, WITHOUT LONG-TERM CURRENT USE OF INSULIN (HCC): ICD-10-CM

## 2023-01-24 DIAGNOSIS — I10 ESSENTIAL HYPERTENSION: Primary | ICD-10-CM

## 2023-01-24 DIAGNOSIS — R06.09 DOE (DYSPNEA ON EXERTION): ICD-10-CM

## 2023-01-24 PROCEDURE — 99214 OFFICE O/P EST MOD 30 MIN: CPT | Performed by: FAMILY MEDICINE

## 2023-01-24 RX ORDER — ASCORBIC ACID 1000 MG
1 TABLET, EXTENDED RELEASE ORAL DAILY
COMMUNITY
Start: 2022-06-09

## 2023-03-21 ENCOUNTER — OFFICE VISIT (OUTPATIENT)
Dept: INTEGRATIVE MEDICINE | Facility: CLINIC | Age: 73
End: 2023-03-21
Payer: MEDICARE

## 2023-03-21 VITALS
HEIGHT: 60 IN | SYSTOLIC BLOOD PRESSURE: 124 MMHG | OXYGEN SATURATION: 98 % | BODY MASS INDEX: 35.34 KG/M2 | DIASTOLIC BLOOD PRESSURE: 78 MMHG | HEART RATE: 71 BPM | WEIGHT: 180 LBS

## 2023-03-21 DIAGNOSIS — E66.9 OBESITY (BMI 30-39.9): ICD-10-CM

## 2023-03-21 DIAGNOSIS — R06.09 DOE (DYSPNEA ON EXERTION): ICD-10-CM

## 2023-03-21 DIAGNOSIS — M15.9 OSTEOARTHRITIS OF MULTIPLE JOINTS, UNSPECIFIED OSTEOARTHRITIS TYPE: ICD-10-CM

## 2023-03-21 DIAGNOSIS — Z00.00 MEDICARE ANNUAL WELLNESS VISIT, INITIAL: Primary | ICD-10-CM

## 2023-03-21 DIAGNOSIS — R20.2 PARESTHESIAS IN LEFT HAND: ICD-10-CM

## 2023-03-21 DIAGNOSIS — E11.65 TYPE 2 DIABETES MELLITUS WITH HYPERGLYCEMIA, WITHOUT LONG-TERM CURRENT USE OF INSULIN (HCC): ICD-10-CM

## 2023-03-21 DIAGNOSIS — M25.442 SWELLING OF HAND JOINT, LEFT: ICD-10-CM

## 2023-03-21 DIAGNOSIS — I10 ESSENTIAL HYPERTENSION: ICD-10-CM

## 2023-03-21 NOTE — ASSESSMENT & PLAN NOTE
Improved HgA1c on January labs. Handout with foods low in glycemic load and index shared. Pt already careful with food choices. Refuses medication at this time. Started Berberine.

## 2023-03-21 NOTE — ASSESSMENT & PLAN NOTE
Had stress test and is following up with cardiologist in a few weeks. No chest pain assoc with [facility].

## 2023-04-13 ENCOUNTER — TELEPHONE (OUTPATIENT)
Dept: FAMILY MEDICINE CLINIC | Facility: CLINIC | Age: 73
End: 2023-04-13

## 2023-05-30 PROBLEM — R00.2 PALPITATIONS: Status: RESOLVED | Noted: 2021-01-08 | Resolved: 2023-05-30

## 2023-05-30 PROBLEM — E66.01 MORBID (SEVERE) OBESITY DUE TO EXCESS CALORIES (HCC): Status: RESOLVED | Noted: 2022-09-15 | Resolved: 2023-05-30

## 2023-08-21 ENCOUNTER — OFFICE VISIT (OUTPATIENT)
Dept: INTEGRATIVE MEDICINE | Facility: CLINIC | Age: 73
End: 2023-08-21
Payer: MEDICARE

## 2023-08-21 VITALS
BODY MASS INDEX: 31.94 KG/M2 | OXYGEN SATURATION: 96 % | DIASTOLIC BLOOD PRESSURE: 80 MMHG | SYSTOLIC BLOOD PRESSURE: 116 MMHG | HEART RATE: 76 BPM | HEIGHT: 61 IN | WEIGHT: 169.19 LBS

## 2023-08-21 DIAGNOSIS — I25.10 ATHEROSCLEROSIS OF RIGHT CORONARY ARTERY: ICD-10-CM

## 2023-08-21 DIAGNOSIS — Z78.9 TAKES DIETARY SUPPLEMENTS: ICD-10-CM

## 2023-08-21 DIAGNOSIS — I10 ESSENTIAL HYPERTENSION: ICD-10-CM

## 2023-08-21 DIAGNOSIS — E11.65 TYPE 2 DIABETES MELLITUS WITH HYPERGLYCEMIA, WITHOUT LONG-TERM CURRENT USE OF INSULIN (HCC): ICD-10-CM

## 2023-08-21 DIAGNOSIS — R53.83 OTHER FATIGUE: Primary | ICD-10-CM

## 2023-08-21 PROCEDURE — 99214 OFFICE O/P EST MOD 30 MIN: CPT | Performed by: FAMILY MEDICINE

## 2023-11-07 ENCOUNTER — LAB ENCOUNTER (OUTPATIENT)
Dept: LAB | Age: 73
End: 2023-11-07
Attending: FAMILY MEDICINE
Payer: MEDICARE

## 2023-11-07 DIAGNOSIS — R53.83 OTHER FATIGUE: ICD-10-CM

## 2023-11-07 DIAGNOSIS — E11.65 TYPE 2 DIABETES MELLITUS WITH HYPERGLYCEMIA, WITHOUT LONG-TERM CURRENT USE OF INSULIN (HCC): ICD-10-CM

## 2023-11-07 DIAGNOSIS — I10 ESSENTIAL HYPERTENSION: ICD-10-CM

## 2023-11-07 DIAGNOSIS — I25.10 ATHEROSCLEROSIS OF RIGHT CORONARY ARTERY: ICD-10-CM

## 2023-11-07 LAB
ALBUMIN SERPL-MCNC: 4.5 G/DL (ref 3.2–4.8)
ALBUMIN/GLOB SERPL: 1.6 {RATIO} (ref 1–2)
ALP LIVER SERPL-CCNC: 101 U/L
ALT SERPL-CCNC: 14 U/L
ANION GAP SERPL CALC-SCNC: 10 MMOL/L (ref 0–18)
AST SERPL-CCNC: 20 U/L (ref ?–34)
BASOPHILS # BLD AUTO: 0.04 X10(3) UL (ref 0–0.2)
BASOPHILS NFR BLD AUTO: 0.6 %
BILIRUB SERPL-MCNC: 0.6 MG/DL (ref 0.2–1.1)
BUN BLD-MCNC: 17 MG/DL (ref 9–23)
BUN/CREAT SERPL: 20.5 (ref 10–20)
CALCIUM BLD-MCNC: 9.6 MG/DL (ref 8.7–10.4)
CHLORIDE SERPL-SCNC: 104 MMOL/L (ref 98–112)
CHOLEST SERPL-MCNC: 325 MG/DL (ref ?–200)
CO2 SERPL-SCNC: 27 MMOL/L (ref 21–32)
CREAT BLD-MCNC: 0.83 MG/DL
CRP SERPL HS-MCNC: 0.96 MG/L (ref ?–3)
DEPRECATED RDW RBC AUTO: 42.2 FL (ref 35.1–46.3)
EGFRCR SERPLBLD CKD-EPI 2021: 75 ML/MIN/1.73M2 (ref 60–?)
EOSINOPHIL # BLD AUTO: 0.14 X10(3) UL (ref 0–0.7)
EOSINOPHIL NFR BLD AUTO: 2.2 %
ERYTHROCYTE [DISTWIDTH] IN BLOOD BY AUTOMATED COUNT: 12.4 % (ref 11–15)
EST. AVERAGE GLUCOSE BLD GHB EST-MCNC: 134 MG/DL (ref 68–126)
FASTING PATIENT LIPID ANSWER: YES
FASTING STATUS PATIENT QL REPORTED: YES
GLOBULIN PLAS-MCNC: 2.9 G/DL (ref 2.8–4.4)
GLUCOSE BLD-MCNC: 101 MG/DL (ref 70–99)
HBA1C MFR BLD: 6.3 % (ref ?–5.7)
HCT VFR BLD AUTO: 41 %
HDLC SERPL-MCNC: 58 MG/DL (ref 40–59)
HGB BLD-MCNC: 13.4 G/DL
IMM GRANULOCYTES # BLD AUTO: 0.02 X10(3) UL (ref 0–1)
IMM GRANULOCYTES NFR BLD: 0.3 %
LDLC SERPL CALC-MCNC: 243 MG/DL (ref ?–100)
LYMPHOCYTES # BLD AUTO: 1.91 X10(3) UL (ref 1–4)
LYMPHOCYTES NFR BLD AUTO: 29.8 %
MCH RBC QN AUTO: 30.2 PG (ref 26–34)
MCHC RBC AUTO-ENTMCNC: 32.7 G/DL (ref 31–37)
MCV RBC AUTO: 92.6 FL
MONOCYTES # BLD AUTO: 0.52 X10(3) UL (ref 0.1–1)
MONOCYTES NFR BLD AUTO: 8.1 %
NEUTROPHILS # BLD AUTO: 3.77 X10 (3) UL (ref 1.5–7.7)
NEUTROPHILS # BLD AUTO: 3.77 X10(3) UL (ref 1.5–7.7)
NEUTROPHILS NFR BLD AUTO: 59 %
NONHDLC SERPL-MCNC: 267 MG/DL (ref ?–130)
OSMOLALITY SERPL CALC.SUM OF ELEC: 294 MOSM/KG (ref 275–295)
PLATELET # BLD AUTO: 311 10(3)UL (ref 150–450)
POTASSIUM SERPL-SCNC: 4.1 MMOL/L (ref 3.5–5.1)
PROT SERPL-MCNC: 7.4 G/DL (ref 5.7–8.2)
RBC # BLD AUTO: 4.43 X10(6)UL
SODIUM SERPL-SCNC: 141 MMOL/L (ref 136–145)
TRIGL SERPL-MCNC: 132 MG/DL (ref 30–149)
TSI SER-ACNC: 1.45 MIU/ML (ref 0.55–4.78)
VLDLC SERPL CALC-MCNC: 31 MG/DL (ref 0–30)
WBC # BLD AUTO: 6.4 X10(3) UL (ref 4–11)

## 2023-11-07 PROCEDURE — 85025 COMPLETE CBC W/AUTO DIFF WBC: CPT

## 2023-11-07 PROCEDURE — 86141 C-REACTIVE PROTEIN HS: CPT

## 2023-11-07 PROCEDURE — 84443 ASSAY THYROID STIM HORMONE: CPT

## 2023-11-07 PROCEDURE — 80053 COMPREHEN METABOLIC PANEL: CPT

## 2023-11-07 PROCEDURE — 83036 HEMOGLOBIN GLYCOSYLATED A1C: CPT

## 2023-11-07 PROCEDURE — 80061 LIPID PANEL: CPT

## 2023-11-07 PROCEDURE — 36415 COLL VENOUS BLD VENIPUNCTURE: CPT

## 2023-11-13 ENCOUNTER — TELEPHONE (OUTPATIENT)
Dept: INTEGRATIVE MEDICINE | Facility: CLINIC | Age: 73
End: 2023-11-13

## 2023-11-13 DIAGNOSIS — E55.9 VITAMIN D DEFICIENCY: Primary | ICD-10-CM

## 2023-11-13 NOTE — TELEPHONE ENCOUNTER
Chart reviewed but cannot find labs demonstrating vitamin D def. Can she find her lab or note that confirms her vitamin D def so we can order it.

## 2023-11-14 LAB — VITAMIN D, 25-OH, TOTAL: 30 NG/ML (ref 30–100)

## 2023-11-15 ENCOUNTER — TELEMEDICINE (OUTPATIENT)
Dept: INTEGRATIVE MEDICINE | Facility: CLINIC | Age: 73
End: 2023-11-15
Payer: MEDICARE

## 2023-11-15 DIAGNOSIS — I25.10 ATHEROSCLEROSIS OF RIGHT CORONARY ARTERY: ICD-10-CM

## 2023-11-15 DIAGNOSIS — E55.9 VITAMIN D DEFICIENCY: ICD-10-CM

## 2023-11-15 DIAGNOSIS — E78.5 HYPERLIPIDEMIA, UNSPECIFIED HYPERLIPIDEMIA TYPE: Primary | ICD-10-CM

## 2023-11-15 DIAGNOSIS — R73.03 PREDIABETES: ICD-10-CM

## 2023-11-15 PROCEDURE — 99214 OFFICE O/P EST MOD 30 MIN: CPT | Performed by: PHYSICIAN ASSISTANT

## 2023-11-15 NOTE — PROGRESS NOTES
Alber Tatum is a 68year old female. Chief Complaint   Patient presents with    Follow - Up     Lab results       HPI:   69-year-old female with known history of hypertension, hyperlipidemia  and prediabetes presents today for follow-up on lab results. High Cholesterol - TGL and HDL improved slightly, lipids higher. Does report having coconut oil and grilled cheese sandwich the night before the blood draw. Started the policosinol and NSK-SD, takes Algae omega oil, Berberine Synergy. Eats vegetarian. Does take Sunfiber once daily in the AM with ACV with lemon water before breakfast. Coconut oil powder every night in tea. Low Vit D despite taking 2000ius     Has been more tried and achy lately. REVIEW OF SYSTEMS:   Review of Systems   Constitutional:  Positive for fatigue. Negative for chills and fever. Eyes: Negative. Negative for visual disturbance. Respiratory: Negative. Negative for cough, shortness of breath and wheezing. Cardiovascular: Negative. Negative for chest pain. Endocrine: Negative. Genitourinary: Negative. Musculoskeletal:  Positive for arthralgias. Skin: Negative. Allergic/Immunologic: Negative. Neurological: Negative. Negative for weakness and headaches. Hematological: Negative. Psychiatric/Behavioral: Negative. FAMILY HISTORY:      Family History   Problem Relation Age of Onset    Cancer Father         colon     Hypertension Mother     Heart Disorder Mother     Breast Cancer Mother 28    Heart Disorder Maternal Grandmother     Ovarian Cancer Neg        MEDICAL HISTORY:     Past Medical History:   Diagnosis Date    High blood pressure        CURRENT MEDICATIONS:     Current Outpatient Medications   Medication Sig Dispense Refill    Ascorbic Acid (VITAMIN C ER) 1000 MG Oral Tab CR Take 1 tablet by mouth daily. NON FORMULARY 100 mg. [place] ubiquinol 100mg 1 daily      Multiple Vitamins-Minerals (CENTRUM OR) Take by mouth.  Multi vitamin 1 daily      NON FORMULARY [place] vitamin k2 180mcg 1 daily      NON FORMULARY       Probiotic Product (PROBIOTIC ADVANCED OR) Take by mouth. Natures bounty         SOCIAL HISTORY:   Lifestyle Factors affecting health:    Diet - Mainly vegetarian, does have yogurt. 1 main meal a day between 2-4pm, does watch sugar intake. Lots of fruit and vegetables. 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 Social History     Socioeconomic History    Marital status:    Tobacco Use    Smoking status: Never    Smokeless tobacco: Never   Vaping Use    Vaping Use: Never used   Substance and Sexual Activity    Alcohol use: Yes    Drug use: Never   Social History Narrative        Mom lives in house in [place] (79 yo - 2021)       SURGICAL HISTORY:     Past Surgical History:   Procedure Laterality Date    D & c      Hc  section level i      x 3       PHYSICAL EXAM:   There were no vitals filed for this visit. Physical Exam  Constitutional:       General: She is not in acute distress. Appearance: Normal appearance. She is not ill-appearing or toxic-appearing. HENT:      Head: Normocephalic and atraumatic. Eyes:      Extraocular Movements: Extraocular movements intact. Pulmonary:      Effort: Pulmonary effort is normal. No respiratory distress. Musculoskeletal:      Cervical back: Normal range of motion. Skin:     Coloration: Skin is not jaundiced. Findings: No lesion. Neurological:      Mental Status: She is alert and oriented to person, place, and time. Psychiatric:         Mood and Affect: Mood normal.         Behavior: Behavior normal.         Thought Content:  Thought content normal.         Judgment: Judgment normal.          ASSESSMENT AND PLAN:     Orders Only on 2023   Component Date Value Ref Range Status    VITAMIN D, 25-OH, TOTAL 11/13/2023 30  30 - 100 ng/mL Final    Comment: Vitamin D Status         25-OH Vitamin D:     Deficiency:                    <20 ng/mL  Insufficiency:             20 - 29 ng/mL  Optimal:                 > or = 30 ng/mL     For 25-OH Vitamin D testing on patients on   D2-supplementation and patients for whom quantitation   of D2 and D3 fractions is required, the QuestAssureD(TM)  25-OH VIT D, (D2,D3), LC/MS/MS is recommended: order   code 10799 (patients >2yrs). See Note 1     Note 1     For additional information, please refer to   http://education. [facility]/faq/EFN612   (This link is being provided for informational/  educational purposes only.)     [facility] Lab Encounter on 11/07/2023   Component Date Value Ref Range Status    Glucose 11/07/2023 101 (H)  70 - 99 mg/dL Final    Sodium 11/07/2023 141  136 - 145 mmol/L Final    Potassium 11/07/2023 4.1  3.5 - 5.1 mmol/L Final    Chloride 11/07/2023 104  98 - 112 mmol/L Final    CO2 11/07/2023 27.0  21.0 - 32.0 mmol/L Final    Anion Gap 11/07/2023 10  0 - 18 mmol/L Final    BUN 11/07/2023 17  9 - 23 mg/dL Final    Creatinine 11/07/2023 0.83  0.55 - 1.02 mg/dL Final    BUN/CREA Ratio 11/07/2023 20.5 (H)  10.0 - 20.0 Final    Calcium, Total 11/07/2023 9.6  8.7 - 10.4 mg/dL Final    Calculated Osmolality 11/07/2023 294  275 - 295 mOsm/kg Final    eGFR-Cr 11/07/2023 75  >=60 mL/min/1.73m2 Final    ALT 11/07/2023 14  10 - 49 U/L Final    AST 11/07/2023 20  <=34 U/L Final    Alkaline Phosphatase 11/07/2023 101  55 - 142 U/L Final    Bilirubin, Total 11/07/2023 0.6  0.2 - 1.1 mg/dL Final    Total Protein 11/07/2023 7.4  5.7 - 8.2 g/dL Final    Albumin 11/07/2023 4.5  3.2 - 4.8 g/dL Final    Globulin  11/07/2023 2.9  2.8 - 4.4 g/dL Final    A/G Ratio 11/07/2023 1.6  1.0 - 2.0 Final    Patient Fasting for CMP? 11/07/2023 Yes   Final    hsCRP 11/07/2023 0.96  <3.00 mg/L Final    Risk Categories     Low Risk      <1.0 mg/L     Average Risk  1.0-3.0 mg/L     High Risk     >3.0 mg/L        Cholesterol, Total 11/07/2023 325 (H)  <200 mg/dL Final    Desirable  <200 mg/dL  Borderline  200-239 mg/dL  High      >=240 mg/dL        HDL Cholesterol 11/07/2023 58  40 - 59 mg/dL Final    Interpretive Information:   An HDL cholesterol <40 mg/dL is low and constitutes a coronary heart disease risk factor. An HDL cholesterol >60 mg/dL is a negative risk factor for coronary heart disease. Triglycerides 11/07/2023 132  30 - 149 mg/dL Final    Reference interval for fasting triglycerides  Desirable: <150 mg/dL  Borderline: 150-199 mg/dL  High: 200-499 mg/dL  Very High: >=500 mg/dL          LDL Cholesterol 11/07/2023 243 (H)  <100 mg/dL Final    Optimal            <100 mg/dL   Near/Above OptimaL 100-129 mg/dL   Borderline High    130-159 mg/dL   High               160-189 mg/dL    Very High          >190 mg/dL         VLDL 11/07/2023 31 (H)  0 - 30 mg/dL Final    Non HDL Chol 11/07/2023 267 (H)  <130 mg/dL Final    Desirable  <130 mg/dL   Borderline  130-159 mg/dL   High        160-189 mg/dL       Very high >=190 mg/dL        Patient Fasting for Lipid? 11/07/2023 Yes   Final    HgbA1C 11/07/2023 6.3 (H)  <5.7 % Final     Normal HbA1C:     <5.7%      Pre-Diabetic:     5.7 - 6.4%      Diabetic:         >6.4%      Diabetic Control: <7.0%        Estimated Average Glucose 11/07/2023 134 (H)  68 - 126 mg/dL Final    eAG is the estimated average glucose calculated from Hgb A1c according to the formula recommended by the American Diabetes Association. eAG levels reflect the long term average glucose and may not correlate with random or fasting glucose levels since these represent specific points in time.            TSH 11/07/2023 1.453  0.550 - 4.780 mIU/mL Final    WBC 11/07/2023 6.4  4.0 - 11.0 x10(3) uL Final    RBC 11/07/2023 4.43  3.80 - 5.30 x10(6)uL Final    HGB 11/07/2023 13.4  12.0 - 16.0 g/dL Final    HCT 11/07/2023 41.0  35.0 - 48.0 % Final    MCV 11/07/2023 92.6  80.0 - 100.0 fL Final    MCH 11/07/2023 30.2  26.0 - 34.0 pg Final    MCHC 11/07/2023 32.7  31.0 - 37.0 g/dL Final    RDW-SD 11/07/2023 42.2  35.1 - 46.3 fL Final    RDW 11/07/2023 12.4  11.0 - 15.0 % Final    PLT 11/07/2023 311.0  150.0 - 450.0 10(3)uL Final    Neutrophil Absolute Prelim 11/07/2023 3.77  1.50 - 7.70 x10 (3) uL Final    Neutrophil Absolute 11/07/2023 3.77  1.50 - 7.70 x10(3) uL Final    Lymphocyte Absolute 11/07/2023 1.91  1.00 - 4.00 x10(3) uL Final    Monocyte Absolute 11/07/2023 0.52  0.10 - 1.00 x10(3) uL Final    Eosinophil Absolute 11/07/2023 0.14  0.00 - 0.70 x10(3) uL Final    Basophil Absolute 11/07/2023 0.04  0.00 - 0.20 x10(3) uL Final    Immature Granulocyte Absolute 11/07/2023 0.02  0.00 - 1.00 x10(3) uL Final    Neutrophil % 11/07/2023 59.0  % Final    Lymphocyte % 11/07/2023 29.8  % Final    Monocyte % 11/07/2023 8.1  % Final    Eosinophil % 11/07/2023 2.2  % Final    Basophil % 11/07/2023 0.6  % Final    Immature Granulocyte % 11/07/2023 0.3  % Final      No results found. 1. Vitamin D deficiency    2. Hyperlipidemia, unspecified hyperlipidemia type    3. Prediabetes  This visit was conducted using Telemedicine with live, interactive video and audio. The patient understands the risks and benefits of Telemedicine and that a Telemedicine visit limits the ability to perform a thorough physical examination which may affect objective findings related to specific symptoms and conditions which can, in turn, affect treatment. The patient was located in the state of [place] at the time of the encounter. Time spent with patient: Over 30 minutes spent in chart review and in direct communication with patient obtaining and reviewing history, creating a unique care plan, explaining the rationale for treatment, reviewing potential SE and overall treatment plan,  documenting all clinical information in Epic. Over 50% of this time was in education, counseling and coordination of care.      Problem List Items Addressed This Visit    None  Visit Diagnoses       Hyperlipidemia, unspecified hyperlipidemia type    -  Primary    Vitamin D deficiency        Prediabetes               Reviewed all lab results with patient and answered questions. Hyperlipidemia with atherosclerosis- TGL and HDL improved slightly, lipids higher. There is a chance her dinner could have negatively impacted her cholesterol the next morning. She refuses statins at this time. Understands standard of care including statin use for possible primary prevention of coronary event. Wishes to pursue dietary, lifestyle and supplemental intervention at this time. Recommend discontinuing the coconut oil every night, and discussed other forms of soluble fiber to incorporate into her diet more regularly -okay to bring oatmeal back in a few times a week with flax and nuts. She is a bit limited in being able to leave her mother, so encouraged using a stationary bike if possible to incorporate some exercise. Consider replacing Berberine with BBR. Prediabetes-hemoglobin A1c remains stable. Asked for the diagnosis of diabetes to be removed. Rec reducing simple carbs. Elements of CardioMetabolic Diet mailed to pt. Continue Ubiquinol, policosinol, NSK-SD, beet root. Low Vit D - increase by 1000-2000ius daily for optimal range. Recheck in [address] [place] [address]. Given further recommendations as below    Orders Placed This Visit:  No orders of the defined types were placed in this encounter. No orders of the defined types were placed in this encounter. Patient Instructions   Switch from Berberine Synergy to:    BBR Complete by [employer]    Advanced Lipid Level Support with Bergamot. BBR Complete is a synergistic blend that naturally supports healthy blood cholesterol levels already within normal range, blood sugar balance, and overall cardiovascular function. Take 4-5 capsules per day. May split dose to take 2-3x/day.       Serving Size: 5 Capsules  Amount Per Serving  Vitamin C . .. 50mg  (as Ascorbic Acid)  Niacin . .. 125mg  (as Nicotinic Acid)  Red Yeast Rice . .. 1250mg  (as Monascus Purpureus)  Berberine HCL . .. 500mg  (97%)  Bergamot Extract . .. 1000mg  (Citrus Bergamia)(standardized to contain a minimum of 38% BPF containing Neohesperidin, Naringin, Neoeriocitrin, Brutieridin, and Melitidin)  Grape Seed Extract . .. 25mg  (95% Proanthocyanidins)    2 . Increase Vit D3 + K2 to 4000ius daily. 3. Will repeat labs at your wellness visit. No follow-ups on file. Patient affirmed understanding of plan and all questions were answered.      Miguelina Shahid PA-C

## 2023-11-16 NOTE — PATIENT INSTRUCTIONS
Switch from Berberine Synergy to:    BBR Complete by [employer]    Advanced Lipid Level Support with Bergamot. BBR Complete is a synergistic blend that naturally supports healthy blood cholesterol levels already within normal range, blood sugar balance, and overall cardiovascular function. Take 4-5 capsules per day. May split dose to take 2-3x/day. Serving Size: 5 Capsules  Amount Per Serving  Vitamin C . .. 50mg  (as Ascorbic Acid)  Niacin . .. 125mg  (as Nicotinic Acid)  Red Yeast Rice . .. 1250mg  (as Monascus Purpureus)  Berberine HCL . .. 500mg  (97%)  Bergamot Extract . .. 1000mg  (Citrus Bergamia)(standardized to contain a minimum of 38% BPF containing Neohesperidin, Naringin, Neoeriocitrin, Brutieridin, and Melitidin)  Grape Seed Extract . .. 25mg  (95% Proanthocyanidins)    2 . Increase Vit D3 + K2 to 4000ius daily. 3. Will repeat labs at your wellness visit.

## 2024-01-23 ENCOUNTER — TELEPHONE (OUTPATIENT)
Dept: FAMILY MEDICINE CLINIC | Facility: CLINIC | Age: 74
End: 2024-01-23

## 2024-04-09 ENCOUNTER — TELEPHONE (OUTPATIENT)
Dept: FAMILY MEDICINE CLINIC | Facility: CLINIC | Age: 74
End: 2024-04-09

## 2024-06-05 ENCOUNTER — TELEPHONE (OUTPATIENT)
Dept: FAMILY MEDICINE CLINIC | Facility: CLINIC | Age: 74
End: 2024-06-05

## 2024-06-05 NOTE — TELEPHONE ENCOUNTER
Patient is due for annual physical exam .  Left message for patient to call us back.         
FAMILY HISTORY:  Family history of Sezary's disease

## 2024-09-20 ENCOUNTER — TELEPHONE (OUTPATIENT)
Dept: FAMILY MEDICINE CLINIC | Facility: CLINIC | Age: 74
End: 2024-09-20